# Patient Record
Sex: MALE | Race: WHITE | NOT HISPANIC OR LATINO | Employment: OTHER | URBAN - METROPOLITAN AREA
[De-identification: names, ages, dates, MRNs, and addresses within clinical notes are randomized per-mention and may not be internally consistent; named-entity substitution may affect disease eponyms.]

---

## 2017-02-17 ENCOUNTER — HOSPITAL ENCOUNTER (EMERGENCY)
Facility: HOSPITAL | Age: 47
Discharge: HOME | End: 2017-02-17
Attending: EMERGENCY MEDICINE | Admitting: EMERGENCY MEDICINE
Payer: COMMERCIAL

## 2017-02-17 VITALS
TEMPERATURE: 97.8 F | RESPIRATION RATE: 17 BRPM | HEART RATE: 95 BPM | OXYGEN SATURATION: 97 % | SYSTOLIC BLOOD PRESSURE: 177 MMHG | BODY MASS INDEX: 38.57 KG/M2 | HEIGHT: 66 IN | WEIGHT: 240 LBS | DIASTOLIC BLOOD PRESSURE: 81 MMHG

## 2017-02-17 DIAGNOSIS — S39.012A LOW BACK STRAIN, INITIAL ENCOUNTER: Primary | ICD-10-CM

## 2017-02-17 PROCEDURE — 99283 EMERGENCY DEPT VISIT LOW MDM: CPT

## 2017-02-17 PROCEDURE — 96372 THER/PROPH/DIAG INJ SC/IM: CPT

## 2017-02-17 RX ORDER — CYCLOBENZAPRINE HCL 10 MG
10 TABLET ORAL 3 TIMES DAILY PRN
Qty: 30 TABLET | Refills: 0 | Status: SHIPPED | OUTPATIENT
Start: 2017-02-17 | End: 2017-06-07

## 2017-02-17 RX ORDER — LIDOCAINE 50 MG/G
1 PATCH TOPICAL ONCE
Status: DISCONTINUED | OUTPATIENT
Start: 2017-02-17 | End: 2017-02-17 | Stop reason: HOSPADM

## 2017-02-17 RX ORDER — NAPROXEN 500 MG/1
500 TABLET ORAL 2 TIMES DAILY WITH MEALS
Qty: 10 TABLET | Refills: 0 | Status: SHIPPED | OUTPATIENT
Start: 2017-02-17 | End: 2017-11-18

## 2017-02-17 RX ORDER — DIAZEPAM 5 MG/ML
5 INJECTION, SOLUTION INTRAMUSCULAR; INTRAVENOUS ONCE
Status: COMPLETED | OUTPATIENT
Start: 2017-02-17 | End: 2017-02-17

## 2017-02-17 RX ORDER — HYDROCODONE BITARTRATE AND ACETAMINOPHEN 5; 325 MG/1; MG/1
1 TABLET ORAL ONCE
Status: COMPLETED | OUTPATIENT
Start: 2017-02-17 | End: 2017-02-17

## 2017-02-17 RX ORDER — KETOROLAC TROMETHAMINE 30 MG/ML
30 INJECTION, SOLUTION INTRAMUSCULAR; INTRAVENOUS ONCE
Status: COMPLETED | OUTPATIENT
Start: 2017-02-17 | End: 2017-02-17

## 2017-02-17 RX ADMIN — KETOROLAC TROMETHAMINE 30 MG: 30 INJECTION, SOLUTION INTRAMUSCULAR at 14:32

## 2017-02-17 RX ADMIN — HYDROCODONE BITARTRATE AND ACETAMINOPHEN 1 TABLET: 5; 325 TABLET ORAL at 14:32

## 2017-02-17 RX ADMIN — DIAZEPAM 5 MG: 5 INJECTION, SOLUTION INTRAMUSCULAR; INTRAVENOUS at 14:32

## 2017-02-17 RX ADMIN — LIDOCAINE 1 PATCH: 50 PATCH CUTANEOUS at 14:32

## 2017-06-07 ENCOUNTER — HOSPITAL ENCOUNTER (EMERGENCY)
Facility: HOSPITAL | Age: 47
Discharge: HOME | End: 2017-06-07
Attending: EMERGENCY MEDICINE
Payer: COMMERCIAL

## 2017-06-07 ENCOUNTER — APPOINTMENT (EMERGENCY)
Dept: RADIOLOGY | Facility: HOSPITAL | Age: 47
End: 2017-06-07
Payer: COMMERCIAL

## 2017-06-07 VITALS
OXYGEN SATURATION: 98 % | RESPIRATION RATE: 18 BRPM | HEIGHT: 66 IN | HEART RATE: 95 BPM | WEIGHT: 230 LBS | BODY MASS INDEX: 36.96 KG/M2 | SYSTOLIC BLOOD PRESSURE: 130 MMHG | TEMPERATURE: 98.1 F | DIASTOLIC BLOOD PRESSURE: 93 MMHG

## 2017-06-07 DIAGNOSIS — S89.91XA RIGHT KNEE INJURY, INITIAL ENCOUNTER: Primary | ICD-10-CM

## 2017-06-07 PROCEDURE — 73564 X-RAY EXAM KNEE 4 OR MORE: CPT

## 2017-06-07 PROCEDURE — 99283 EMERGENCY DEPT VISIT LOW MDM: CPT

## 2017-06-07 RX ORDER — HYDROCODONE BITARTRATE AND ACETAMINOPHEN 5; 325 MG/1; MG/1
1 TABLET ORAL ONCE
Status: COMPLETED | OUTPATIENT
Start: 2017-06-07 | End: 2017-06-07

## 2017-06-07 RX ORDER — HYDROCODONE BITARTRATE AND ACETAMINOPHEN 5; 325 MG/1; MG/1
1 TABLET ORAL EVERY 6 HOURS PRN
Qty: 15 TABLET | Refills: 0 | Status: SHIPPED | OUTPATIENT
Start: 2017-06-07 | End: 2017-11-18

## 2017-06-07 RX ADMIN — HYDROCODONE BITARTRATE AND ACETAMINOPHEN 1 TABLET: 5; 325 TABLET ORAL at 20:47

## 2017-09-28 ENCOUNTER — HOSPITAL ENCOUNTER (EMERGENCY)
Facility: HOSPITAL | Age: 47
Discharge: HOME | End: 2017-09-28
Attending: EMERGENCY MEDICINE | Admitting: EMERGENCY MEDICINE
Payer: COMMERCIAL

## 2017-09-28 ENCOUNTER — APPOINTMENT (EMERGENCY)
Dept: RADIOLOGY | Facility: HOSPITAL | Age: 47
End: 2017-09-28
Payer: COMMERCIAL

## 2017-09-28 VITALS
HEART RATE: 71 BPM | RESPIRATION RATE: 20 BRPM | DIASTOLIC BLOOD PRESSURE: 65 MMHG | OXYGEN SATURATION: 94 % | SYSTOLIC BLOOD PRESSURE: 134 MMHG | BODY MASS INDEX: 37.12 KG/M2 | TEMPERATURE: 97.8 F | WEIGHT: 230 LBS

## 2017-09-28 DIAGNOSIS — M10.9 ACUTE GOUT OF LEFT FOOT: Primary | ICD-10-CM

## 2017-09-28 LAB
ALBUMIN SERPL BCP-MCNC: 3.7 G/DL (ref 3.5–5)
ALP SERPL-CCNC: 75 U/L (ref 46–116)
ALT SERPL W P-5'-P-CCNC: 17 U/L (ref 12–78)
ANION GAP SERPL CALCULATED.3IONS-SCNC: 6 MMOL/L (ref 4–13)
AST SERPL W P-5'-P-CCNC: 10 U/L (ref 5–45)
BASOPHILS # BLD AUTO: 0.06 THOUSANDS/ΜL (ref 0–0.1)
BASOPHILS NFR BLD AUTO: 1 % (ref 0–1)
BILIRUB SERPL-MCNC: 0.3 MG/DL (ref 0.2–1)
BUN SERPL-MCNC: 20 MG/DL (ref 5–25)
CALCIUM SERPL-MCNC: 9.2 MG/DL (ref 8.3–10.1)
CHLORIDE SERPL-SCNC: 99 MMOL/L (ref 100–108)
CO2 SERPL-SCNC: 32 MMOL/L (ref 21–32)
CREAT SERPL-MCNC: 1.14 MG/DL (ref 0.6–1.3)
EOSINOPHIL # BLD AUTO: 0.22 THOUSAND/ΜL (ref 0–0.61)
EOSINOPHIL NFR BLD AUTO: 2 % (ref 0–6)
ERYTHROCYTE [DISTWIDTH] IN BLOOD BY AUTOMATED COUNT: 12.3 % (ref 11.6–15.1)
GFR SERPL CREATININE-BSD FRML MDRD: 77 ML/MIN/1.73SQ M
GLUCOSE SERPL-MCNC: 129 MG/DL (ref 65–140)
HCT VFR BLD AUTO: 49.5 % (ref 36.5–49.3)
HGB BLD-MCNC: 16.9 G/DL (ref 12–17)
LYMPHOCYTES # BLD AUTO: 2.28 THOUSANDS/ΜL (ref 0.6–4.47)
LYMPHOCYTES NFR BLD AUTO: 19 % (ref 14–44)
MCH RBC QN AUTO: 32.5 PG (ref 26.8–34.3)
MCHC RBC AUTO-ENTMCNC: 34.1 G/DL (ref 31.4–37.4)
MCV RBC AUTO: 95 FL (ref 82–98)
MONOCYTES # BLD AUTO: 0.89 THOUSAND/ΜL (ref 0.17–1.22)
MONOCYTES NFR BLD AUTO: 7 % (ref 4–12)
NEUTROPHILS # BLD AUTO: 8.49 THOUSANDS/ΜL (ref 1.85–7.62)
NEUTS SEG NFR BLD AUTO: 71 % (ref 43–75)
NRBC BLD AUTO-RTO: 0 /100 WBCS
PLATELET # BLD AUTO: 275 THOUSANDS/UL (ref 149–390)
PMV BLD AUTO: 10.6 FL (ref 8.9–12.7)
POTASSIUM SERPL-SCNC: 4.8 MMOL/L (ref 3.5–5.3)
PROT SERPL-MCNC: 7.3 G/DL (ref 6.4–8.2)
RBC # BLD AUTO: 5.2 MILLION/UL (ref 3.88–5.62)
SODIUM SERPL-SCNC: 137 MMOL/L (ref 136–145)
URATE SERPL-MCNC: 7.2 MG/DL (ref 4.2–8)
WBC # BLD AUTO: 11.98 THOUSAND/UL (ref 4.31–10.16)

## 2017-09-28 PROCEDURE — 85025 COMPLETE CBC W/AUTO DIFF WBC: CPT | Performed by: EMERGENCY MEDICINE

## 2017-09-28 PROCEDURE — 96372 THER/PROPH/DIAG INJ SC/IM: CPT

## 2017-09-28 PROCEDURE — 36415 COLL VENOUS BLD VENIPUNCTURE: CPT

## 2017-09-28 PROCEDURE — 84550 ASSAY OF BLOOD/URIC ACID: CPT | Performed by: EMERGENCY MEDICINE

## 2017-09-28 PROCEDURE — 99283 EMERGENCY DEPT VISIT LOW MDM: CPT

## 2017-09-28 PROCEDURE — 73630 X-RAY EXAM OF FOOT: CPT

## 2017-09-28 PROCEDURE — 80053 COMPREHEN METABOLIC PANEL: CPT | Performed by: EMERGENCY MEDICINE

## 2017-09-28 RX ORDER — OXYCODONE HYDROCHLORIDE AND ACETAMINOPHEN 5; 325 MG/1; MG/1
2 TABLET ORAL ONCE
Status: COMPLETED | OUTPATIENT
Start: 2017-09-28 | End: 2017-09-28

## 2017-09-28 RX ORDER — NAPROXEN 500 MG/1
500 TABLET ORAL 2 TIMES DAILY WITH MEALS
Qty: 20 TABLET | Refills: 0 | Status: SHIPPED | OUTPATIENT
Start: 2017-09-28 | End: 2017-11-18

## 2017-09-28 RX ORDER — KETOROLAC TROMETHAMINE 30 MG/ML
30 INJECTION, SOLUTION INTRAMUSCULAR; INTRAVENOUS ONCE
Status: COMPLETED | OUTPATIENT
Start: 2017-09-28 | End: 2017-09-28

## 2017-09-28 RX ADMIN — KETOROLAC TROMETHAMINE 30 MG: 30 INJECTION, SOLUTION INTRAMUSCULAR at 11:42

## 2017-09-28 RX ADMIN — OXYCODONE HYDROCHLORIDE AND ACETAMINOPHEN 2 TABLET: 5; 325 TABLET ORAL at 11:41

## 2017-09-28 NOTE — DISCHARGE INSTRUCTIONS
Gout   WHAT YOU NEED TO KNOW:   Gout is a form of arthritis that causes severe joint pain, redness, swelling, and stiffness  Acute gout pain starts suddenly, gets worse quickly, and stops on its own  Acute gout can become chronic and cause permanent damage to the joints  DISCHARGE INSTRUCTIONS:   Return to the emergency department if:   · You have severe pain in one or more of your joints that you cannot tolerate  · You have a fever or redness that spreads beyond the joint area  Contact your healthcare provider if:   · You have new symptoms, such as a rash, after you start gout treatment  · Your joint pain and swelling do not go away, even after treatment  · You are not urinating as much or as often as you usually do  · You have trouble taking your gout medicines  · You have questions or concerns about your condition or care  Medicines: You may need any of the following:  · Prescription pain medicine  may be given  Ask your healthcare provider how to take this medicine safely  Some prescription pain medicines contain acetaminophen  Do not take other medicines that contain acetaminophen without talking to your healthcare provider  Too much acetaminophen may cause liver damage  Prescription pain medicine may cause constipation  Ask your healthcare provider how to prevent or treat constipation  · NSAIDs , such as ibuprofen, help decrease swelling, pain, and fever  This medicine is available with or without a doctor's order  NSAIDs can cause stomach bleeding or kidney problems in certain people  If you take blood thinner medicine, always ask your healthcare provider if NSAIDs are safe for you  Always read the medicine label and follow directions  · Gout medicine  decreases joint pain and swelling  It may also be given to prevent new gout attacks  · Steroids  reduce inflammation and can help your joint stiffness and pain during gout attacks      · Uric acid medicine  may be given to reduce the amount of uric acid your body makes  Some medicines may help you pass more uric acid when you urinate  · Take your medicine as directed  Contact your healthcare provider if you think your medicine is not helping or if you have side effects  Tell him or her if you are allergic to any medicine  Keep a list of the medicines, vitamins, and herbs you take  Include the amounts, and when and why you take them  Bring the list or the pill bottles to follow-up visits  Carry your medicine list with you in case of an emergency  Follow up with your healthcare provider as directed:  Write down your questions so you remember to ask them during your visits  Manage gout:   · Rest your painful joint so it can heal   Your healthcare provider may recommend crutches or a walker if the affected joint is in a leg  · Apply ice to your joint  Ice decreases pain and swelling  Use an ice pack, or put crushed ice in a plastic bag  Cover the ice pack or bag with a towel before you apply it to your painful joint  Apply ice for 15 to 20 minutes every hour, or as directed  · Elevate your joint  Elevation helps reduce swelling and pain  Raise your joint above the level of your heart as often as you can  Prop your painful joint on pillows to keep it above your heart comfortably  · Go to physical therapy if directed  A physical therapist can teach you exercises to improve flexibility and range of motion  Prevent gout attacks:   · Do not eat high-purine foods  These foods include meats, seafood, asparagus, spinach, cauliflower, and some types of beans  Healthcare providers may tell you to eat more low-fat milk products, such as yogurt  Milk products may decrease your risk for gout attacks  Vitamin C and coffee may also help  Your healthcare provider or dietitian can help you create a meal plan  · Drink more liquids as directed  Liquids such as water help remove uric acid from your body   Ask how much liquid to drink each day and which liquids are best for you  · Manage your weight  Weight loss may decrease the amount of uric acid in your body  Exercise can help you lose weight  Talk to your healthcare provider about the best exercises for you  · Control your blood sugar level if you have diabetes  Keep your blood sugar level in a normal range  This can help prevent gout attacks  · Limit or do not drink alcohol as directed  Alcohol can trigger a gout attack  Alcohol also increases your risk for dehydration  Ask your healthcare provider if alcohol is safe for you  © 2017 [facility] Information is for End User's use only and may not be sold, redistributed or otherwise used for commercial purposes  All illustrations and images included in CareNotes® are the copyrighted property of [employer] D [employer]  or Andres Willis  The above information is an  only  It is not intended as medical advice for individual conditions or treatments  Talk to your doctor, nurse or pharmacist before following any medical regimen to see if it is safe and effective for you

## 2017-09-28 NOTE — ED PROVIDER NOTES
History  Chief Complaint   Patient presents with    Gout Pain     PT c/o "gout flare up" in left foot starting yesterday  No other pain any where else  Pt states he started prednisone yesterday  History provided by:  Patient   used: No    Toe Pain   Location:  Left great toe  Severity:  Moderate  Onset quality:  Sudden  Duration:  2 days  Timing:  Constant  Progression:  Worsening  Chronicity:  Recurrent  Context:  Hx of gout  Relieved by:  Nothing  Worsened by:  Palp  Ineffective treatments:  None tried  Associated symptoms: no abdominal pain, no chest pain, no congestion, no cough, no diarrhea, no fatigue, no fever, no headaches, no nausea, no rash, no shortness of breath, no sore throat and no vomiting        Prior to Admission Medications   Prescriptions Last Dose Informant Patient Reported? Taking? HYDROcodone-acetaminophen (NORCO) 5-325 mg per tablet   No No   Sig: Take 1 tablet by mouth every 6 (six) hours as needed for pain for up to 15 doses Max Daily Amount: 4 tablets   allopurinol (ZYLOPRIM) 300 mg tablet   Yes No   Sig: Take 300 mg by mouth daily   losartan (COZAAR) 100 MG tablet   Yes No   Sig: Take 100 mg by mouth daily   methocarbamol (ROBAXIN) 750 mg tablet   Yes No   Sig: Take 750 mg by mouth 4 (four) times a day   naproxen (NAPROSYN) 500 mg tablet   No No   Sig: Take 1 tablet by mouth 2 (two) times a day with meals for 5 days      Facility-Administered Medications: None       Past Medical History:   Diagnosis Date    Gout     Hypertension        Past Surgical History:   Procedure Laterality Date    APPENDECTOMY      BACK SURGERY      JOINT REPLACEMENT      REPLACEMENT TOTAL KNEE Right        History reviewed  No pertinent family history  I have reviewed and agree with the history as documented      Social History   Substance Use Topics    Smoking status: Current Every Day Smoker     Packs/day: 2 00     Types: Cigarettes    Smokeless tobacco: Never Used   Alyssa Jean Alcohol use 2 4 oz/week     4 Cans of beer per week        Review of Systems   Constitutional: Negative for activity change, appetite change, diaphoresis, fatigue and fever  HENT: Negative for congestion, sore throat and trouble swallowing  Eyes: Negative for pain and visual disturbance  Respiratory: Negative for cough, chest tightness and shortness of breath  Cardiovascular: Negative for chest pain  Gastrointestinal: Negative for abdominal pain, diarrhea, nausea and vomiting  Endocrine: Negative for polyphagia and polyuria  Genitourinary: Negative for dysuria, flank pain, frequency, hematuria and urgency  Musculoskeletal: Negative for back pain, gait problem, neck pain and neck stiffness  Skin: Negative for color change and rash  Allergic/Immunologic: Negative for immunocompromised state  Neurological: Negative for dizziness, speech difficulty, numbness and headaches  Hematological: Does not bruise/bleed easily  Psychiatric/Behavioral: Negative for confusion and decreased concentration  Physical Exam  ED Triage Vitals   Temperature Pulse Respirations Blood Pressure SpO2   09/28/17 1005 09/28/17 1005 09/28/17 1005 09/28/17 1005 09/28/17 1005   97 8 °F (36 6 °C) 80 20 148/91 96 %      Temp Source Heart Rate Source Patient Position - Orthostatic VS BP Location FiO2 (%)   09/28/17 1005 09/28/17 1005 09/28/17 1005 09/28/17 1213 --   Temporal Monitor Lying Right arm       Pain Score       09/28/17 1005       Worst Possible Pain           Physical Exam   Constitutional: He is oriented to person, place, and time  He appears well-developed and well-nourished  HENT:   Head: Normocephalic  Eyes: Conjunctivae and EOM are normal  Pupils are equal, round, and reactive to light  No scleral icterus  Neck: Normal range of motion  Neck supple  Cardiovascular: Normal rate and regular rhythm  Pulmonary/Chest: Effort normal and breath sounds normal  No respiratory distress     Abdominal: Soft  Bowel sounds are normal  He exhibits no distension  There is no tenderness  There is no rebound and no guarding  Musculoskeletal: Normal range of motion  He exhibits tenderness  He exhibits no deformity  Exquisite tenderness to palpation over the left 1st MTP joint  Otherwise unremarkable exam   Normal capillary refill  Palpable DP and PT pulses  Normal sensation  Lymphadenopathy:     He has no cervical adenopathy  Neurological: He is alert and oriented to person, place, and time  Coordination normal    Skin: Skin is warm and dry  He is not diaphoretic  Psychiatric: He has a normal mood and affect  Vitals reviewed        ED Medications  Medications   ketorolac (TORADOL) 30 mg/mL injection 30 mg (30 mg Intramuscular Given 9/28/17 1142)   oxyCODONE-acetaminophen (PERCOCET) 5-325 mg per tablet 2 tablet (2 tablets Oral Given 9/28/17 1141)       Diagnostic Studies  Labs Reviewed   CBC AND DIFFERENTIAL - Abnormal        Result Value Ref Range Status    WBC 11 98 (*) 4 31 - 10 16 Thousand/uL Final    Hematocrit 49 5 (*) 36 5 - 49 3 % Final    Neutrophils Absolute 8 49 (*) 1 85 - 7 62 Thousands/µL Final    RBC 5 20  3 88 - 5 62 Million/uL Final    Hemoglobin 16 9  12 0 - 17 0 g/dL Final    MCV 95  82 - 98 fL Final    MCH 32 5  26 8 - 34 3 pg Final    MCHC 34 1  31 4 - 37 4 g/dL Final    RDW 12 3  11 6 - 15 1 % Final    MPV 10 6  8 9 - 12 7 fL Final    Platelets 101  737 - 390 Thousands/uL Final    nRBC 0  /100 WBCs Final    Neutrophils Relative 71  43 - 75 % Final    Lymphocytes Relative 19  14 - 44 % Final    Monocytes Relative 7  4 - 12 % Final    Eosinophils Relative 2  0 - 6 % Final    Basophils Relative 1  0 - 1 % Final    Lymphocytes Absolute 2 28  0 60 - 4 47 Thousands/µL Final    Monocytes Absolute 0 89  0 17 - 1 22 Thousand/µL Final    Eosinophils Absolute 0 22  0 00 - 0 61 Thousand/µL Final    Basophils Absolute 0 06  0 00 - 0 10 Thousands/µL Final   COMPREHENSIVE METABOLIC PANEL - Abnormal Chloride 99 (*) 100 - 108 mmol/L Final    Sodium 137  136 - 145 mmol/L Final    Potassium 4 8  3 5 - 5 3 mmol/L Final    Comment: Slightly Hemolyzed; Results May be Affected    CO2 32  21 - 32 mmol/L Final    Anion Gap 6  4 - 13 mmol/L Final    BUN 20  5 - 25 mg/dL Final    Creatinine 1 14  0 60 - 1 30 mg/dL Final    Comment: Standardized to IDMS reference method    Glucose 129  65 - 140 mg/dL Final    Comment:   If the patient is fasting, the ADA then defines impaired fasting glucose as > 100 mg/dL and diabetes as > or equal to 123 mg/dL  Specimen collection should occur prior to Sulfasalazine administration due to the potential for falsely depressed results  Specimen collection should occur prior to Sulfapyridine administration due to the potential for falsely elevated results  Calcium 9 2  8 3 - 10 1 mg/dL Final    AST 10  5 - 45 U/L Final    Comment: Slightly Hemolyzed; Results May be Affected  Specimen collection should occur prior to Sulfasalazine administration due to the potential for falsely depressed results  ALT 17  12 - 78 U/L Final    Comment:   Specimen collection should occur prior to Sulfasalazine administration due to the potential for falsely depressed results  Alkaline Phosphatase 75  46 - 116 U/L Final    Total Protein 7 3  6 4 - 8 2 g/dL Final    Albumin 3 7  3 5 - 5 0 g/dL Final    Total Bilirubin 0 30  0 20 - 1 00 mg/dL Final    eGFR 77  ml/min/1 73sq m Final    Narrative:     National Kidney Disease Education Program recommendations are as follows:  GFR calculation is accurate only with a steady state creatinine  Chronic Kidney disease less than 60 ml/min/1 73 sq  meters  Kidney failure less than 15 ml/min/1 73 sq  meters  URIC ACID - Normal    Uric Acid 7 2  4 2 - 8 0 mg/dL Final    Comment: Specimen collection should occur prior to Metamizole administration due to the potential for falsely depressed results         XR foot 3+ views LEFT    (Results Pending) Procedures  Procedures      Phone Contacts  ED Phone Contact    ED Course  ED Course                                MDM  Number of Diagnoses or Management Options  Acute gout of left foot: new and requires workup  Diagnosis management comments: 32-year-old male with history of gout presents complaining of left-sided foot pain that started yesterday  The patient initially had some pain over the arch of his foot but then the pain seemed to migrate to the base of his left great toe  Pain is now severe and much worse with any pressure  No fevers or recent illnesses  No trauma  Denies other complaints  Patient has a history of gout and states that his current symptoms seem similar to prior episodes  Patient started a course of prednisone yesterday  Presents to the ED for worsening symptoms  X-ray was negative for fracture dislocation  Patient was treated symptomatically for presumed gout flare  He will be discharged on NSAIDs  Advised that he may continue to take prednisone and follow up promptly with his primary care physician  Discussed return precautions  Amount and/or Complexity of Data Reviewed  Tests in the radiology section of CPT®: reviewed and ordered  Review and summarize past medical records: yes      CritCare Time    Disposition  Final diagnoses:   Acute gout of left foot     ED Disposition     ED Disposition Condition Comment    Discharge  Guido Patel discharge to home/self care  Condition at discharge: Good        Follow-up Information     Follow up With Specialties Details Why Contact Info    Charity Carvajal MD   Please follow-up with your primary care physician in 2 to 3 days to ensure resolution of your symptoms  If you have new or worsening symptoms please call your doctor or return to the emergency department   [address] [place] [address]          Discharge Medication List as of 9/28/2017 12:13 PM      CONTINUE these medications which have CHANGED    Details   naproxen (NAPROSYN) 500 mg tablet Take 1 tablet by mouth 2 (two) times a day with meals for 10 days, Starting Thu 9/28/2017, Until Sun 10/8/2017, Print         CONTINUE these medications which have NOT CHANGED    Details   allopurinol (ZYLOPRIM) 300 mg tablet Take 300 mg by mouth daily, Until Discontinued, Historical Med      HYDROcodone-acetaminophen (NORCO) 5-325 mg per tablet Take 1 tablet by mouth every 6 (six) hours as needed for pain for up to 15 doses Max Daily Amount: 4 tablets, Starting 6/7/2017, Until Discontinued, Print      losartan (COZAAR) 100 MG tablet Take 100 mg by mouth daily, Until Discontinued, Historical Med      methocarbamol (ROBAXIN) 750 mg tablet Take 750 mg by mouth 4 (four) times a day, Until Discontinued, Historical Med           No discharge procedures on file      ED Provider  Electronically Signed by       Joan Underwood MD  09/28/17 8649

## 2017-11-18 ENCOUNTER — HOSPITAL ENCOUNTER (EMERGENCY)
Facility: HOSPITAL | Age: 47
Discharge: HOME | End: 2017-11-18
Attending: EMERGENCY MEDICINE | Admitting: EMERGENCY MEDICINE
Payer: COMMERCIAL

## 2017-11-18 ENCOUNTER — APPOINTMENT (EMERGENCY)
Dept: CT IMAGING | Facility: HOSPITAL | Age: 47
End: 2017-11-18
Payer: COMMERCIAL

## 2017-11-18 ENCOUNTER — APPOINTMENT (EMERGENCY)
Dept: RADIOLOGY | Facility: HOSPITAL | Age: 47
End: 2017-11-18
Payer: COMMERCIAL

## 2017-11-18 VITALS
BODY MASS INDEX: 36.96 KG/M2 | WEIGHT: 230 LBS | RESPIRATION RATE: 20 BRPM | SYSTOLIC BLOOD PRESSURE: 150 MMHG | DIASTOLIC BLOOD PRESSURE: 92 MMHG | TEMPERATURE: 97.6 F | OXYGEN SATURATION: 99 % | HEART RATE: 80 BPM | HEIGHT: 66 IN

## 2017-11-18 DIAGNOSIS — J18.9: Primary | ICD-10-CM

## 2017-11-18 LAB
ALBUMIN SERPL BCP-MCNC: 3.7 G/DL (ref 3.5–5)
ALP SERPL-CCNC: 64 U/L (ref 46–116)
ALT SERPL W P-5'-P-CCNC: 42 U/L (ref 12–78)
ANION GAP SERPL CALCULATED.3IONS-SCNC: 10 MMOL/L (ref 4–13)
AST SERPL W P-5'-P-CCNC: 26 U/L (ref 5–45)
BASOPHILS # BLD AUTO: 0.06 THOUSANDS/ΜL (ref 0–0.1)
BASOPHILS NFR BLD AUTO: 0 % (ref 0–1)
BILIRUB SERPL-MCNC: 0.3 MG/DL (ref 0.2–1)
BILIRUB UR QL STRIP: NEGATIVE
BUN SERPL-MCNC: 11 MG/DL (ref 5–25)
CALCIUM SERPL-MCNC: 9 MG/DL (ref 8.3–10.1)
CHLORIDE SERPL-SCNC: 104 MMOL/L (ref 100–108)
CLARITY UR: CLEAR
CO2 SERPL-SCNC: 26 MMOL/L (ref 21–32)
COLOR UR: YELLOW
CREAT SERPL-MCNC: 0.98 MG/DL (ref 0.6–1.3)
EOSINOPHIL # BLD AUTO: 0.17 THOUSAND/ΜL (ref 0–0.61)
EOSINOPHIL NFR BLD AUTO: 1 % (ref 0–6)
ERYTHROCYTE [DISTWIDTH] IN BLOOD BY AUTOMATED COUNT: 13.3 % (ref 11.6–15.1)
GFR SERPL CREATININE-BSD FRML MDRD: 93 ML/MIN/1.73SQ M
GLUCOSE SERPL-MCNC: 105 MG/DL (ref 65–140)
GLUCOSE UR STRIP-MCNC: NEGATIVE MG/DL
HCT VFR BLD AUTO: 44.1 % (ref 36.5–49.3)
HGB BLD-MCNC: 15.1 G/DL (ref 12–17)
HGB UR QL STRIP.AUTO: NEGATIVE
KETONES UR STRIP-MCNC: ABNORMAL MG/DL
LEUKOCYTE ESTERASE UR QL STRIP: NEGATIVE
LYMPHOCYTES # BLD AUTO: 3.78 THOUSANDS/ΜL (ref 0.6–4.47)
LYMPHOCYTES NFR BLD AUTO: 26 % (ref 14–44)
MCH RBC QN AUTO: 31.9 PG (ref 26.8–34.3)
MCHC RBC AUTO-ENTMCNC: 34.2 G/DL (ref 31.4–37.4)
MCV RBC AUTO: 93 FL (ref 82–98)
MONOCYTES # BLD AUTO: 1.09 THOUSAND/ΜL (ref 0.17–1.22)
MONOCYTES NFR BLD AUTO: 8 % (ref 4–12)
NEUTROPHILS # BLD AUTO: 9.19 THOUSANDS/ΜL (ref 1.85–7.62)
NEUTS SEG NFR BLD AUTO: 64 % (ref 43–75)
NITRITE UR QL STRIP: NEGATIVE
NRBC BLD AUTO-RTO: 0 /100 WBCS
PH UR STRIP.AUTO: 5.5 [PH] (ref 4.5–8)
PLATELET # BLD AUTO: 346 THOUSANDS/UL (ref 149–390)
PMV BLD AUTO: 10.6 FL (ref 8.9–12.7)
POTASSIUM SERPL-SCNC: 3.9 MMOL/L (ref 3.5–5.3)
PROT SERPL-MCNC: 7.1 G/DL (ref 6.4–8.2)
PROT UR STRIP-MCNC: NEGATIVE MG/DL
RBC # BLD AUTO: 4.74 MILLION/UL (ref 3.88–5.62)
SODIUM SERPL-SCNC: 140 MMOL/L (ref 136–145)
SP GR UR STRIP.AUTO: 1.02 (ref 1–1.03)
UROBILINOGEN UR QL STRIP.AUTO: 0.2 E.U./DL
WBC # BLD AUTO: 14.36 THOUSAND/UL (ref 4.31–10.16)

## 2017-11-18 PROCEDURE — 80053 COMPREHEN METABOLIC PANEL: CPT | Performed by: EMERGENCY MEDICINE

## 2017-11-18 PROCEDURE — 99284 EMERGENCY DEPT VISIT MOD MDM: CPT

## 2017-11-18 PROCEDURE — 96361 HYDRATE IV INFUSION ADD-ON: CPT

## 2017-11-18 PROCEDURE — 96365 THER/PROPH/DIAG IV INF INIT: CPT

## 2017-11-18 PROCEDURE — 81003 URINALYSIS AUTO W/O SCOPE: CPT | Performed by: EMERGENCY MEDICINE

## 2017-11-18 PROCEDURE — 71020 HB CHEST X-RAY 2VW FRONTAL&LATL: CPT

## 2017-11-18 PROCEDURE — 96375 TX/PRO/DX INJ NEW DRUG ADDON: CPT

## 2017-11-18 PROCEDURE — 85025 COMPLETE CBC W/AUTO DIFF WBC: CPT | Performed by: EMERGENCY MEDICINE

## 2017-11-18 PROCEDURE — 36415 COLL VENOUS BLD VENIPUNCTURE: CPT | Performed by: EMERGENCY MEDICINE

## 2017-11-18 PROCEDURE — 74176 CT ABD & PELVIS W/O CONTRAST: CPT

## 2017-11-18 RX ORDER — AZITHROMYCIN 250 MG/1
500 TABLET, FILM COATED ORAL ONCE
Status: COMPLETED | OUTPATIENT
Start: 2017-11-18 | End: 2017-11-18

## 2017-11-18 RX ORDER — KETOROLAC TROMETHAMINE 30 MG/ML
15 INJECTION, SOLUTION INTRAMUSCULAR; INTRAVENOUS ONCE
Status: COMPLETED | OUTPATIENT
Start: 2017-11-18 | End: 2017-11-18

## 2017-11-18 RX ORDER — ONDANSETRON 2 MG/ML
4 INJECTION INTRAMUSCULAR; INTRAVENOUS ONCE
Status: COMPLETED | OUTPATIENT
Start: 2017-11-18 | End: 2017-11-18

## 2017-11-18 RX ORDER — AZITHROMYCIN 250 MG/1
250 TABLET, FILM COATED ORAL DAILY
Qty: 4 TABLET | Refills: 0 | Status: SHIPPED | OUTPATIENT
Start: 2017-11-18 | End: 2017-11-22

## 2017-11-18 RX ORDER — IBUPROFEN 400 MG/1
800 TABLET ORAL ONCE
Status: COMPLETED | OUTPATIENT
Start: 2017-11-18 | End: 2017-11-18

## 2017-11-18 RX ORDER — IBUPROFEN 800 MG/1
800 TABLET ORAL 2 TIMES DAILY
Qty: 20 TABLET | Refills: 0 | Status: SHIPPED | OUTPATIENT
Start: 2017-11-18 | End: 2018-07-16 | Stop reason: ALTCHOICE

## 2017-11-18 RX ADMIN — CEFTRIAXONE 2000 MG: 2 INJECTION, SOLUTION INTRAVENOUS at 22:45

## 2017-11-18 RX ADMIN — ONDANSETRON 4 MG: 2 INJECTION INTRAMUSCULAR; INTRAVENOUS at 20:32

## 2017-11-18 RX ADMIN — IBUPROFEN 800 MG: 400 TABLET, FILM COATED ORAL at 22:45

## 2017-11-18 RX ADMIN — KETOROLAC TROMETHAMINE 15 MG: 30 INJECTION, SOLUTION INTRAMUSCULAR at 20:33

## 2017-11-18 RX ADMIN — AZITHROMYCIN 500 MG: 250 TABLET, FILM COATED ORAL at 22:45

## 2017-11-18 RX ADMIN — SODIUM CHLORIDE 1000 ML: 0.9 INJECTION, SOLUTION INTRAVENOUS at 20:27

## 2017-11-19 NOTE — DISCHARGE INSTRUCTIONS
Pneumonitis   WHAT YOU NEED TO KNOW:   Pneumonitis is inflammation of your lungs  The inflammation can make it hard to breathe and prevent you from getting enough oxygen  Anything that irritates your lung tissues can lead to pneumonitis  The longer you are exposed, the more damage your lungs will develop  Pneumonitis can last a short time or become chronic  DISCHARGE INSTRUCTIONS:   Return to the emergency department if:   · You have trouble breathing  · You faint or cannot think clearly  · You cough up blood  · Your lips or fingernails turn blue or gray  · Your lips, tongue, or throat swell and you have trouble breathing or swallowing  Contact your healthcare provider if:   · You have a fever that lasts more than 3 days, even with treatment  · Your chest pain or breathing problems do not go away or get worse  · Your cough does not get better with treatment  · You vomit or have diarrhea  · You have questions or concerns about your condition or care  Medicines:   · Medicines  may help decrease coughing and inflammation, open airways, and make it easier for you to breathe  You may also need medicine to treat a bacterial infection  · Take your medicine as directed  Contact your healthcare provider if you think your medicine is not helping or if you have side effects  Tell him or her if you are allergic to any medicine  Keep a list of the medicines, vitamins, and herbs you take  Include the amounts, and when and why you take them  Bring the list or the pill bottles to follow-up visits  Carry your medicine list with you in case of an emergency  Manage your symptoms:   · Rest as directed  Keep the head of your bed raised to help you breathe easier  You can also raise your head and shoulders up on pillows or rest in a reclining chair  · Do deep breathing and coughing  Deep breaths help open your airway and clear mucus or congestion   Take a deep breath and hold it for as long as you can  Let the air out and then cough strongly  You may be given an incentive spirometer to help you take deep breaths  Put the plastic piece in your mouth  Take a slow, deep breath  Then let the air out and cough  Repeat these steps as directed by your healthcare provider  · Do not smoke  Avoid secondhand smoke  Nicotine and other chemicals in cigarettes and cigars can make it harder for your lung inflammation to get better  Ask your healthcare provider for information if you currently smoke and need help to quit  E-cigarettes or smokeless tobacco still contain nicotine  Talk to your healthcare provider before you use these products  · Do not drink alcohol when you are sick  Alcohol dulls your urge to cough and sneeze  Alcohol also causes your body to lose fluid  This can make the mucus in your lungs thicker and harder to cough up  · Drink more liquids  Liquids help keep your air passages moist and better able to get rid of germs and other irritants  Ask your healthcare provider how much liquid to drink each day and which liquids are best for you  · Use a cool mist humidifier  A humidifier will help increase air moisture in your home  This may make it easier for you to breathe and help decrease your cough  · Go to pulmonary rehabilitation (rehab) as directed  Your healthcare provider may recommend rehab if you develop chronic pneumonitis  A rehab therapist can teach you breathing exercises to help keep your airway open  Prevent pneumonitis:   · Avoid anything that irritates your lungs  Examples include smoke, dust, and fumes  You may need to wear a mask if you work with something that irritates your lungs  · Be careful with household   Do not combine products that contain bleach and chlorine  Do not use these chemicals in closed spaces  Open a window or door to keep the air flowing  Follow up with your healthcare provider or pulmonologist as directed:   You may need to return for more tests  Write down your questions so you remember to ask them during your visits  © 2017 [address] Information is for End User's use only and may not be sold, redistributed or otherwise used for commercial purposes  All illustrations and images included in CareNotes® are the copyrighted property of [employer]  or Andres Willis  The above information is an  only  It is not intended as medical advice for individual conditions or treatments  Talk to your doctor, nurse or pharmacist before following any medical regimen to see if it is safe and effective for you  Cigarette Smoking and Your Health   AMBULATORY CARE:   Risks to your health if you smoke:  Nicotine and other chemicals found in tobacco damage every cell in your body  Even if you are a light smoker, you have an increased risk for cancer, heart disease, and lung disease  If you are pregnant or have diabetes, smoking increases your risk for complications  Benefits to your health if you stop smoking:   · You decrease respiratory symptoms such as coughing, wheezing, and shortness of breath  · You reduce your risk for cancers of the lung, mouth, throat, kidney, bladder, pancreas, stomach, and cervix  If you already have cancer, you increase the benefits of chemotherapy  You also reduce your risk for cancer returning or a second cancer from developing  · You reduce your risk for heart disease, blood clots, heart attack, and stroke  · You reduce your risk for lung infections, and diseases such as pneumonia, asthma, chronic bronchitis, and emphysema  · Your circulation improves  More oxygen can be delivered to your body  If you have diabetes, you lower your risk for complications, such as kidney, artery, and eye diseases  You also lower your risk for nerve damage  Nerve damage can lead to amputations, poor vision, and blindness      · You improve your body's ability to heal and to fight infections  Benefits to the health of others if you stop smoking:  Tobacco is harmful to nonsmokers who breathe in your secondhand smoke  The following are ways the health of others around you may improve when you stop smoking:  · You lower the risks for lung cancer and heart disease in nonsmoking adults  · If you are pregnant, you lower the risk for miscarriage, early delivery, low birth weight, and stillbirth  You also lower your baby's risk for SIDS, obesity, developmental delay, and neurobehavioral problems, such as ADHD  · If you have children, you lower their risk for ear infections, colds, pneumonia, bronchitis, and asthma  For more information and support to stop smoking:   · [employer]  gov  Phone: 2- 293 - 161-4206  Web Address: www [employer]  Follow up with your healthcare provider as directed:  Write down your questions so you remember to ask them during your visits  © 2017 [address] Information is for End User's use only and may not be sold, redistributed or otherwise used for commercial purposes  All illustrations and images included in CareNotes® are the copyrighted property of [employer]  or [address]  The above information is an  only  It is not intended as medical advice for individual conditions or treatments  Talk to your doctor, nurse or pharmacist before following any medical regimen to see if it is safe and effective for you  How to Stop Smoking   WHAT YOU NEED TO KNOW:   You will improve your health and the health of others around you if you stop smoking  Your risk for heart and lung disease, cancer, stroke, heart attack, and vision problems will also decrease  You can benefit from quitting no matter how long you have smoked  DISCHARGE INSTRUCTIONS:   Prepare to stop smoking:  Nicotine is a highly addictive drug found in cigarettes  Withdrawal symptoms can happen when you stop smoking and make it hard to quit  These include anxiety, depression, irritability, trouble sleeping, and increased appetite  You increase your chances of success if you prepare to quit  · Set a quit date  Deryl Sin a date that is within the next 2 weeks  Do not pick a day that you think may be stressful or busy  Write down the day or [place] it on your calender  · Tell friends and family that you plan to quit  Explain that you may have withdrawal symptoms when you try to quit  Ask them to support you  They may be able to encourage you and help reduce your stress to make it easier for you to quit  · Make a list of your reasons for quitting  Put the list somewhere you will see it every day, such as your refrigerator  You can look at the list when you have a craving  · Remove all tobacco and nicotine products from your home, car, and workplace  Also, remove anything else that will tempt you to smoke, such as lighters, matches, or ashtrays  Clean your car, home, and places at work that smell like smoke  The smell of smoke can trigger a craving  · Identify triggers that make you want to smoke  This may include activities, feelings, or people  Also write down 1 way you can deal with each of your triggers  For example, if you want to smoke as soon as you wake up, plan another activity during this time, such as exercise  · Make a plan for how you will quit  Learn about the tools that can help you quit, such as medicine, counseling, or nicotine replacement therapy  Choose at least 2 options to help you quit  Tools to help you stop smoking:   · Counseling  from a trained healthcare provider can provide you with support and skills to quit smoking  The provider will also teach you to manage your withdrawal symptoms and cravings  You may receive counseling from one counselor, in group therapy, or through phone therapy called a quit line       · Nicotine replacement therapy (NRT)  such as nicotine patches, gum, or lozenges may help reduce your nicotine cravings  You may get these without a doctor's order  Do not use e-cigarettes or smokeless tobacco in place of cigarettes or to help you quit  They still contain nicotine  · Prescription medicines  such as nasal sprays or nicotine inhalers may help reduce your withdrawal symptoms  Other medicines may also be used to reduce your urge to smoke  Ask your healthcare provider about these medicines  You may need to start certain medicines 2 weeks before your quit date for them to work well  · Hypnosis  is a practice that helps guide you through thoughts and feelings  Hypnosis may help decrease your cravings and make you more willing to quit  · Acupuncture therapy  uses very thin needles to balance energy channels in the body  This is thought to help decrease cravings and symptoms of nicotine withdrawal      · Support groups  let you talk to others who are trying to quit or have already quit  It may be helpful to speak with others about how they quit  Manage your cravings:   · Avoid situations, people, and places that tempt you to smoke  Go to nonsmoking places, such as libraries or restaurants  Understand what tempts you and try to avoid these things  · Keep your hands busy  Hold things such as a stress ball or pen  · Put candy or toothpicks in your mouth  Keep lollipops, sugarless gum, or toothpicks with you at all times  · Do not have alcohol or caffeine  These drinks may tempt you to smoke  Drink healthy liquids such as water or juice instead  · Reward yourself when you resist your cravings  Rewards will motivate you and help you stay positive  · Do an activity that distracts you from your craving  Examples include going for a walk, exercising, or cleaning  Prevent weight gain after you quit:  You may gain a few pounds after you quit smoking  It is healthier for you to gain a few pounds than to continue to smoke   The following can help you prevent weight gain:  · Eat healthy foods  These include fruits, vegetables, whole-grain breads, low-fat dairy products, beans, lean meats, and fish  Eat healthy snacks, such as low-fat yogurt, if you get hungry between meals  · Drink water before, during, and between meals  This will make your stomach feel full and help prevent you from overeating  Ask your healthcare provider how much liquid to drink each day and which liquids are best for you  · Exercise  Take a walk or do some kind of exercise every day  Ask your healthcare provider what exercise is right for you  This may help reduce your cravings and reduce stress  For support and more information:   · [employer]  gov  Phone: 9- 926 - 406-6504  Web Address: www "[employer]  © 2017 [address] Alan Information is for End User's use only and may not be sold, redistributed or otherwise used for commercial purposes  All illustrations and images included in CareNotes® are the copyrighted property of [employer] Inc  or Andres Willis  The above information is an  only  It is not intended as medical advice for individual conditions or treatments  Talk to your doctor, nurse or pharmacist before following any medical regimen to see if it is safe and effective for you

## 2017-11-19 NOTE — ED PROVIDER NOTES
History  Chief Complaint   Patient presents with    Back Pain     Pt reports right sided lower back pain that started this morning, has previous hx of back surgery; pt describes pain as someone "pounding with a sledgehammer", some burning with urination     HPI  82-year-old white male with a chief complaint of right-sided back pain that started this morning  Patient states he has prior history of back surgery however this pain is much different  Patient states that he feels like a sledgehammer is hitting him constantly in his right flank region  Patient also complains of some burning on urination x2 today  Patient has no prior history renal calculi stay  Patient states that his mother does have a history of having a kidney stone  Patient denies any fever, chills, nausea, or vomiting  Prior to Admission Medications   Prescriptions Last Dose Informant Patient Reported? Taking?   allopurinol (ZYLOPRIM) 300 mg tablet   Yes Yes   Sig: Take 300 mg by mouth daily   losartan (COZAAR) 100 MG tablet   Yes Yes   Sig: Take 100 mg by mouth daily   methocarbamol (ROBAXIN) 750 mg tablet   Yes Yes   Sig: Take 750 mg by mouth 4 (four) times a day      Facility-Administered Medications: None       Past Medical History:   Diagnosis Date    Gout     Hypertension        Past Surgical History:   Procedure Laterality Date    APPENDECTOMY      BACK SURGERY      JOINT REPLACEMENT      REPLACEMENT TOTAL KNEE Right        History reviewed  No pertinent family history  I have reviewed and agree with the history as documented  Social History   Substance Use Topics    Smoking status: Current Every Day Smoker     Packs/day: 2 00     Types: Cigarettes    Smokeless tobacco: Never Used    Alcohol use Yes      Comment: occasional        Review of Systems   Constitutional: Negative for chills and fever  HENT: Negative for congestion and rhinorrhea  Eyes: Negative for discharge and visual disturbance     Respiratory: Negative for shortness of breath and wheezing  Cardiovascular: Negative for chest pain and palpitations  Gastrointestinal: Negative for abdominal pain and vomiting  Endocrine: Negative for polydipsia and polyuria  Genitourinary: Positive for dysuria  Negative for hematuria  Musculoskeletal: Positive for back pain  Negative for arthralgias, gait problem and neck stiffness  Skin: Negative for rash and wound  Neurological: Negative for dizziness and headaches  Psychiatric/Behavioral: Negative for confusion and suicidal ideas  Physical Exam  ED Triage Vitals   Temperature Pulse Respirations Blood Pressure SpO2   11/18/17 1944 11/18/17 1944 11/18/17 1944 11/18/17 1944 11/18/17 1944   97 6 °F (36 4 °C) 84 22 (!) 175/90 100 %      Temp Source Heart Rate Source Patient Position - Orthostatic VS BP Location FiO2 (%)   11/18/17 1944 11/18/17 2127 11/18/17 1944 11/18/17 1944 --   Oral Monitor Sitting Right arm       Pain Score       11/18/17 1944       Worst Possible Pain           Orthostatic Vital Signs  Vitals:    11/18/17 1944 11/18/17 2127 11/18/17 2245   BP: (!) 175/90 149/76 150/92   Pulse: 84 72 80   Patient Position - Orthostatic VS: Sitting Sitting Sitting       Physical Exam   Constitutional: He is oriented to person, place, and time  He appears well-developed and well-nourished  79-year-old white male rocking on the edge of the stretcher complaining of right-sided back pain  HENT:   Head: Normocephalic and atraumatic  Mouth/Throat: Oropharynx is clear and moist    Eyes: EOM are normal  Pupils are equal, round, and reactive to light  Neck: Normal range of motion  Neck supple  Cardiovascular: Normal rate, regular rhythm and normal heart sounds  Pulmonary/Chest: Effort normal    Coarse breath sounds bilaterally  There is strong nicotine smell coming from patient  Abdominal: Soft  Bowel sounds are normal  He exhibits no mass  There is tenderness   There is no rebound and no guarding  A hernia is present  There is a central ventral hernia which patient states he has had an a large eschar to the right lower quadrant approximately 6 inches horizontally  Musculoskeletal: Normal range of motion  He exhibits no edema, tenderness or deformity  Back:  There is positive CVA tenderness on the right however patient also jumps when I tapped him on the left there is about a 2 inch eschar at the L3-L5 region of his low back  Patient states this pain is different than his chronic back pain  Neurological: He is alert and oriented to person, place, and time  No cranial nerve deficit  He exhibits normal muscle tone  Coordination normal    Skin: Skin is warm and dry  Nursing note and vitals reviewed        ED Medications  Medications   cefTRIAXone (ROCEPHIN) IVPB (premix) 2,000 mg (0 mg Intravenous Stopped 11/18/17 2311)   sodium chloride 0 9 % bolus 1,000 mL (0 mL Intravenous Stopped 11/18/17 2216)   ondansetron (ZOFRAN) injection 4 mg (4 mg Intravenous Given 11/18/17 2032)   ketorolac (TORADOL) 30 mg/mL injection 15 mg (15 mg Intravenous Given 11/18/17 2033)   azithromycin (ZITHROMAX) tablet 500 mg (500 mg Oral Given 11/18/17 2245)   ibuprofen (MOTRIN) tablet 800 mg (800 mg Oral Given 11/18/17 2245)       Diagnostic Studies  Results Reviewed     Procedure Component Value Units Date/Time    Comprehensive metabolic panel [[id number]] Collected:  11/18/17 2027    Lab Status:  Final result Specimen:  Blood from Arm, Right Updated:  11/18/17 2051     Sodium 140 mmol/L      Potassium 3 9 mmol/L      Chloride 104 mmol/L      CO2 26 mmol/L      Anion Gap 10 mmol/L      BUN 11 mg/dL      Creatinine 0 98 mg/dL      Glucose 105 mg/dL      Calcium 9 0 mg/dL      AST 26 U/L      ALT 42 U/L      Alkaline Phosphatase 64 U/L      Total Protein 7 1 g/dL      Albumin 3 7 g/dL      Total Bilirubin 0 30 mg/dL      eGFR 93 ml/min/1 73sq m     Narrative:         National Kidney Disease Education Program recommendations are as follows:  GFR calculation is accurate only with a steady state creatinine  Chronic Kidney disease less than 60 ml/min/1 73 sq  meters  Kidney failure less than 15 ml/min/1 73 sq  meters  CBC and differential [[id number]]  (Abnormal) Collected:  11/18/17 2027    Lab Status:  Final result Specimen:  Blood from Arm, Right Updated:  11/18/17 2038     WBC 14 36 (H) Thousand/uL      RBC 4 74 Million/uL      Hemoglobin 15 1 g/dL      Hematocrit 44 1 %      MCV 93 fL      MCH 31 9 pg      MCHC 34 2 g/dL      RDW 13 3 %      MPV 10 6 fL      Platelets 831 Thousands/uL      nRBC 0 /100 WBCs      Neutrophils Relative 64 %      Lymphocytes Relative 26 %      Monocytes Relative 8 %      Eosinophils Relative 1 %      Basophils Relative 0 %      Neutrophils Absolute 9 19 (H) Thousands/µL      Lymphocytes Absolute 3 78 Thousands/µL      Monocytes Absolute 1 09 Thousand/µL      Eosinophils Absolute 0 17 Thousand/µL      Basophils Absolute 0 06 Thousands/µL     UA w Reflex to Microscopic w Reflex to Culture [[id number]]  (Abnormal) Collected:  11/18/17 2028    Lab Status:  Final result Specimen:  Urine from Urine, Clean Catch Updated:  11/18/17 2037     Color, UA Yellow     Clarity, UA Clear     Specific Gravity, UA 1 025     pH, UA 5 5     Leukocytes, UA Negative     Nitrite, UA Negative     Protein, UA Negative mg/dl      Glucose, UA Negative mg/dl      Ketones, UA Trace (A) mg/dl      Urobilinogen, UA 0 2 E U /dl      Bilirubin, UA Negative     Blood, UA Negative                 XR chest 2 views   ED Interpretation by Diana Gonzalez DO (11/18 2311)   Pneumonitis RLL      CT renal stone study abdomen pelvis without contrast   Final Result by Mike Ortiz MD (11/18 2057)      1  No obstructive uropathy  2   Tree-in-bud opacity in the right lower lobe  While this can be seen with acute infectious or inflammatory pneumonitis, this may have been present previously, partially imaged   A chronic inflammatory or infectious etiology versus smoking related    pneumonitis such as RB-ILD is also possible  Workstation performed: [id number]                    Procedures  Procedures       Phone Contacts  ED Phone Contact    ED Course  ED Course as of Nov 18 2344   Sat Nov 18, 2017   2227 WBC: (!) 14 36      10:30 p m :  Re-examined patient  Patient is lying comfortably on the stretcher he states he feels much better  I reviewed the chest x-ray and the labs with the patient  I encourage patient to stop smoking and to follow up with the pulmonologist as well as his family doctor  I provided material for him for smoking cessation  Patient states he smokes about 2 and half packs of cigarettes a day  Patient felt much better prior to discharge in place and patient was placed on Zithromax and a high dose Motrin for the pain  MDM  CritCare Time     Differential diagnosis includes:  1  Back pain  2  Right flank pain  3  Rule out renal colic  4  Renal calculus  5  UTI  6  Dysuria  7  Chronic back pain    Disposition  Final diagnoses:   Right lower lobe pneumonitis ([address] )     Time reflects when diagnosis was documented in both MDM as applicable and the Disposition within this note     Time User Action Codes Description Comment    11/18/2017 10:36 PM Sherl Blocker Add [J18 1] Right lower lobe pneumonitis [facility])       ED Disposition     ED Disposition Condition Comment    Discharge  Tierney Celestin discharge to home/self care  Condition at discharge: Good        Follow-up Information     Follow up With Specialties Details Why Darleen Brownlee MD  In 2 days  [address]  [place] [address]      Billy Lawler MD Pulmonology, Neurology, Pulmonary Disease, Sleep Medicine In 3 days  [address] [address]  [phone]          Discharge Medication List as of 11/18/2017 10:49 PM      START taking these medications    Details azithromycin (ZITHROMAX) 250 mg tablet Take 1 tablet by mouth daily for 4 days, Starting Sat 11/18/2017, Until Wed 11/22/2017, Print         CONTINUE these medications which have NOT CHANGED    Details   allopurinol (ZYLOPRIM) 300 mg tablet Take 300 mg by mouth daily, Until Discontinued, Historical Med      losartan (COZAAR) 100 MG tablet Take 100 mg by mouth daily, Until Discontinued, Historical Med      methocarbamol (ROBAXIN) 750 mg tablet Take 750 mg by mouth 4 (four) times a day, Until Discontinued, Historical Med           No discharge procedures on file      ED Provider  Electronically Signed by           Coleman Bone,   11/18/17 0440

## 2018-01-19 ENCOUNTER — GENERIC CONVERSION - ENCOUNTER (OUTPATIENT)
Dept: OTHER | Facility: OTHER | Age: 48
End: 2018-01-19

## 2018-01-22 ENCOUNTER — TRANSCRIBE ORDERS (OUTPATIENT)
Dept: ADMINISTRATIVE | Facility: HOSPITAL | Age: 48
End: 2018-01-22

## 2018-01-22 DIAGNOSIS — R93.89 ABNORMAL CHEST CT: Primary | ICD-10-CM

## 2018-01-22 DIAGNOSIS — R06.02 SHORTNESS OF BREATH: ICD-10-CM

## 2018-01-24 VITALS
HEIGHT: 67 IN | BODY MASS INDEX: 34.75 KG/M2 | WEIGHT: 221.38 LBS | DIASTOLIC BLOOD PRESSURE: 90 MMHG | OXYGEN SATURATION: 95 % | HEART RATE: 87 BPM | SYSTOLIC BLOOD PRESSURE: 124 MMHG

## 2018-01-26 ENCOUNTER — TELEPHONE (OUTPATIENT)
Dept: PULMONOLOGY | Facility: CLINIC | Age: 48
End: 2018-01-26

## 2018-01-26 DIAGNOSIS — R06.2 WHEEZING: ICD-10-CM

## 2018-01-26 DIAGNOSIS — R06.02 SHORTNESS OF BREATH: Primary | ICD-10-CM

## 2018-01-26 RX ORDER — ALBUTEROL SULFATE 1.25 MG/3ML
1 SOLUTION RESPIRATORY (INHALATION) EVERY 6 HOURS PRN
Qty: 90 VIAL | Refills: 3 | Status: SHIPPED | OUTPATIENT
Start: 2018-01-26 | End: 2018-07-16

## 2018-01-26 RX ORDER — ALBUTEROL SULFATE 90 UG/1
2 AEROSOL, METERED RESPIRATORY (INHALATION) EVERY 4 HOURS PRN
Qty: 8 G | Refills: 3 | Status: SHIPPED | OUTPATIENT
Start: 2018-01-26 | End: 2018-07-16 | Stop reason: SDUPTHER

## 2018-01-26 NOTE — TELEPHONE ENCOUNTER
PT never got medication that was sent to the pharmacy  PT's wife came into the office today 1/26/18 asking for a written script to take to [facility] pharmacy however with Epic I am unable to do that  If you could please send rx for Albuterol Sulfate (2 5 MG/3ML) 0 083% Inhalation Neb Solution     30 Days 1080ML 3-Refills    And Ventonlin  (90 Base)  330 days 1x8GM inhaler and 3 refills

## 2018-02-09 ENCOUNTER — HOSPITAL ENCOUNTER (OUTPATIENT)
Dept: CT IMAGING | Facility: HOSPITAL | Age: 48
Discharge: HOME | End: 2018-02-09
Attending: INTERNAL MEDICINE
Payer: COMMERCIAL

## 2018-02-09 ENCOUNTER — HOSPITAL ENCOUNTER (OUTPATIENT)
Dept: PULMONOLOGY | Facility: HOSPITAL | Age: 48
Discharge: HOME | End: 2018-02-09
Attending: INTERNAL MEDICINE
Payer: COMMERCIAL

## 2018-02-09 DIAGNOSIS — R93.89 ABNORMAL CHEST CT: ICD-10-CM

## 2018-02-09 DIAGNOSIS — R06.02 SHORTNESS OF BREATH: ICD-10-CM

## 2018-02-09 PROCEDURE — 94060 EVALUATION OF WHEEZING: CPT | Performed by: INTERNAL MEDICINE

## 2018-02-09 PROCEDURE — 94727 GAS DIL/WSHOT DETER LNG VOL: CPT

## 2018-02-09 PROCEDURE — 94060 EVALUATION OF WHEEZING: CPT

## 2018-02-09 PROCEDURE — 94729 DIFFUSING CAPACITY: CPT | Performed by: INTERNAL MEDICINE

## 2018-02-09 PROCEDURE — 94729 DIFFUSING CAPACITY: CPT

## 2018-02-09 PROCEDURE — 71250 CT THORAX DX C-: CPT

## 2018-02-09 PROCEDURE — 94760 N-INVAS EAR/PLS OXIMETRY 1: CPT

## 2018-02-09 PROCEDURE — 94726 PLETHYSMOGRAPHY LUNG VOLUMES: CPT | Performed by: INTERNAL MEDICINE

## 2018-02-09 RX ORDER — ALBUTEROL SULFATE 2.5 MG/3ML
2.5 SOLUTION RESPIRATORY (INHALATION) ONCE
Status: COMPLETED | OUTPATIENT
Start: 2018-02-09 | End: 2018-02-09

## 2018-02-09 RX ADMIN — ALBUTEROL SULFATE 2.5 MG: 2.5 SOLUTION RESPIRATORY (INHALATION) at 14:05

## 2018-02-16 ENCOUNTER — OFFICE VISIT (OUTPATIENT)
Dept: PULMONOLOGY | Facility: CLINIC | Age: 48
End: 2018-02-16
Payer: COMMERCIAL

## 2018-02-16 VITALS
WEIGHT: 223 LBS | BODY MASS INDEX: 35.84 KG/M2 | HEART RATE: 78 BPM | OXYGEN SATURATION: 98 % | SYSTOLIC BLOOD PRESSURE: 136 MMHG | HEIGHT: 66 IN | DIASTOLIC BLOOD PRESSURE: 80 MMHG

## 2018-02-16 DIAGNOSIS — G47.33 OSA (OBSTRUCTIVE SLEEP APNEA): ICD-10-CM

## 2018-02-16 DIAGNOSIS — R06.02 SOB (SHORTNESS OF BREATH): ICD-10-CM

## 2018-02-16 DIAGNOSIS — G47.00 INSOMNIA, UNSPECIFIED TYPE: ICD-10-CM

## 2018-02-16 DIAGNOSIS — R93.89 ABNORMAL CHEST CT: ICD-10-CM

## 2018-02-16 DIAGNOSIS — Z72.0 TOBACCO ABUSE: ICD-10-CM

## 2018-02-16 DIAGNOSIS — J41.0 SIMPLE CHRONIC BRONCHITIS (HCC): Primary | ICD-10-CM

## 2018-02-16 PROBLEM — J42 CHRONIC BRONCHITIS (HCC): Status: ACTIVE | Noted: 2018-01-19

## 2018-02-16 PROCEDURE — 99214 OFFICE O/P EST MOD 30 MIN: CPT | Performed by: PHYSICIAN ASSISTANT

## 2018-02-16 RX ORDER — ZOLPIDEM TARTRATE 10 MG/1
10 TABLET ORAL
Qty: 30 TABLET | Refills: 0 | Status: SHIPPED | OUTPATIENT
Start: 2018-02-16 | End: 2018-03-13 | Stop reason: SDUPTHER

## 2018-02-16 RX ORDER — FLUTICASONE FUROATE AND VILANTEROL 100; 25 UG/1; UG/1
1 POWDER RESPIRATORY (INHALATION) DAILY
Qty: 1 EACH | Refills: 3 | Status: SHIPPED | OUTPATIENT
Start: 2018-02-16 | End: 2018-07-16 | Stop reason: SDUPTHER

## 2018-02-16 NOTE — PATIENT INSTRUCTIONS
Will start Breo 1 puff daily, rinse mouth after use  Continue albuterol 3 - 4 times per day as needed  Complete sleep study  Start Ambien at night as needed for insomnia  Be sure to have at least 8 hours devoted to sleep befoore taking  Don't drive or operate machinery while taking it  If any side effcts please let us know

## 2018-02-16 NOTE — PROGRESS NOTES
Assessment:    1  Simple chronic bronchitis (HCC)  fluticasone furoate-vilanterol (BREO ELLIPTA)    DISCONTINUED: Umeclidinium-Vilanterol (ANORO ELLIPTA) 62 5-25 MCG/INH AEPB   2  SOB (shortness of breath)     3  Abnormal chest CT     4  Insomnia, unspecified type  zolpidem (AMBIEN) 10 mg tablet    Diagnostic Sleep Study   5  MOISES (obstructive sleep apnea)  Diagnostic Sleep Study     CT of chest performed on 2/9 without contrast revealed emphysema with multiple small nodules measuring 4 and 5 mm  Pulmonary function tests: FEV1: 1 48L  (41 % predicted), FVC:  2 62L (59 % predicted), FEV1/FVC:  70%, DLCO: 74%      Plan:     COPD/chronic bronchitis - No wheezing on today's exam  Will continue with albuterol 3-4 times per day as needed  He did not get the Patience Nelson after last visit  He has tried Anoro in the past which significantly improved his breathing but he has vision problems with it  Will give him Breo to use 1 puff daily  Reviewed CT scan with emphysema and small nodules  Given smoking history will need follow up in 1 year per guidelines  Insomnia/MOISES - Suspicion for sleep apnea given night time awakenings, excessive daytime sleepiness  Will order in lab sleep study  Will also give him Ambien to help him sleep at night  Told him to be sure he has 8 hours to sleep  Will let us know if any side effects  Tobacco abuse - He is not currently interested in quitting  Follow up in 4-6 weeks once sleep study done, sooner if necessary  Subjective:     Patient ID: Dayna Platt is a 41 y o  male  Chief Complaint:  Patient is a 42 yo current smoker of about 2 PPD, 40 pack year smoking history also worked in construction  He was treated in November for pneumonia, recently given prednisone taper and started on nebs  He is here today for follow up  He is still having some GONZALEZ and cough  He is using the albuterol 3 times per day  He did not get the Patience Nelson so is not on any long acting meds   He is also complaining of insomnia, has a hard time falling and staying asleep  He does snore at night, has witnessed apneic episodes  He is tired during the day, can easily fall asleep during the day  Review of Systems   Constitutional: Negative  HENT: Negative  Respiratory: Positive for cough and shortness of breath  Cardiovascular: Negative  Gastrointestinal: Negative  Genitourinary: Negative  Musculoskeletal: Positive for back pain  Skin: Negative  Allergic/Immunologic: Negative  Neurological: Negative  Psychiatric/Behavioral: Negative  Objective:    Physical Exam   Constitutional: He is oriented to person, place, and time  He appears well-developed and well-nourished  No distress  HENT:   Mouth/Throat: Oropharynx is clear and moist    Eyes: Pupils are equal, round, and reactive to light  Cardiovascular: Normal rate, regular rhythm and normal heart sounds  No murmur heard  Pulmonary/Chest: Effort normal    Diminished breath sounds bilaterally, no wheezing or rhonchi   Musculoskeletal: Normal range of motion  Neurological: He is alert and oriented to person, place, and time  Skin: Skin is warm and dry  Psychiatric: He has a normal mood and affect   His behavior is normal  Judgment and thought content normal

## 2018-03-12 ENCOUNTER — TELEPHONE (OUTPATIENT)
Dept: PULMONOLOGY | Facility: CLINIC | Age: 48
End: 2018-03-12

## 2018-03-13 ENCOUNTER — TELEPHONE (OUTPATIENT)
Dept: PULMONOLOGY | Facility: CLINIC | Age: 48
End: 2018-03-13

## 2018-03-13 DIAGNOSIS — G47.00 INSOMNIA, UNSPECIFIED TYPE: ICD-10-CM

## 2018-03-13 DIAGNOSIS — G47.33 OSA (OBSTRUCTIVE SLEEP APNEA): Primary | ICD-10-CM

## 2018-03-13 RX ORDER — ZOLPIDEM TARTRATE 10 MG/1
10 TABLET ORAL
Qty: 30 TABLET | Refills: 0 | OUTPATIENT
Start: 2018-03-13 | End: 2018-04-02 | Stop reason: SDUPTHER

## 2018-03-13 NOTE — TELEPHONE ENCOUNTER
I called in 1 month  I spoke with patient about scheduling sleep study and he states his insurance denied it  I do not see documentation in the chart about it   I told him I will order a home sleep study and see if that is covered

## 2018-03-13 NOTE — TELEPHONE ENCOUNTER
Olivia Zamora the pt wife left a voicemail saying the patient needs medication, however she would like a phone call to discuss what is needed      Thank you

## 2018-03-14 ENCOUNTER — TELEPHONE (OUTPATIENT)
Dept: PULMONOLOGY | Facility: CLINIC | Age: 48
End: 2018-03-14

## 2018-03-14 NOTE — TELEPHONE ENCOUNTER
A letter was mailed to pt explaining that we are not PAR with KISHORE MESSINA [facility] (medical assistance)  I also left a message for him to call us concerning this  Luis STONE

## 2018-04-02 ENCOUNTER — TRANSCRIBE ORDERS (OUTPATIENT)
Dept: ADMINISTRATIVE | Facility: HOSPITAL | Age: 48
End: 2018-04-02

## 2018-04-02 DIAGNOSIS — R10.9 ABDOMINAL PAIN, UNSPECIFIED ABDOMINAL LOCATION: Primary | ICD-10-CM

## 2018-04-02 DIAGNOSIS — G47.00 INSOMNIA, UNSPECIFIED TYPE: ICD-10-CM

## 2018-04-02 DIAGNOSIS — K46.9 ABDOMINAL HERNIA WITHOUT OBSTRUCTION AND WITHOUT GANGRENE, RECURRENCE NOT SPECIFIED, UNSPECIFIED HERNIA TYPE: ICD-10-CM

## 2018-04-03 RX ORDER — ZOLPIDEM TARTRATE 10 MG/1
10 TABLET ORAL
Qty: 30 TABLET | Refills: 0 | OUTPATIENT
Start: 2018-04-03 | End: 2018-07-16 | Stop reason: ALTCHOICE

## 2018-04-03 NOTE — TELEPHONE ENCOUNTER
This needs to be signed by PA or  before we can close it  It was called to pharmacy yesterday by Dora Ndiaye  Thank you

## 2018-06-03 ENCOUNTER — HOSPITAL ENCOUNTER (EMERGENCY)
Facility: HOSPITAL | Age: 48
Discharge: HOME | End: 2018-06-03
Admitting: EMERGENCY MEDICINE
Payer: COMMERCIAL

## 2018-06-03 ENCOUNTER — APPOINTMENT (EMERGENCY)
Dept: CT IMAGING | Facility: HOSPITAL | Age: 48
End: 2018-06-03
Payer: COMMERCIAL

## 2018-06-03 ENCOUNTER — APPOINTMENT (EMERGENCY)
Dept: RADIOLOGY | Facility: HOSPITAL | Age: 48
End: 2018-06-03
Payer: COMMERCIAL

## 2018-06-03 VITALS
RESPIRATION RATE: 19 BRPM | OXYGEN SATURATION: 100 % | HEART RATE: 88 BPM | TEMPERATURE: 97.4 F | DIASTOLIC BLOOD PRESSURE: 80 MMHG | SYSTOLIC BLOOD PRESSURE: 146 MMHG

## 2018-06-03 DIAGNOSIS — M54.32 SCIATICA OF LEFT SIDE: Primary | ICD-10-CM

## 2018-06-03 LAB
ALBUMIN SERPL BCP-MCNC: 3.8 G/DL (ref 3.5–5)
ALP SERPL-CCNC: 79 U/L (ref 46–116)
ALT SERPL W P-5'-P-CCNC: 35 U/L (ref 12–78)
ANION GAP SERPL CALCULATED.3IONS-SCNC: 8 MMOL/L (ref 4–13)
AST SERPL W P-5'-P-CCNC: 22 U/L (ref 5–45)
BASOPHILS # BLD AUTO: 0.04 THOUSANDS/ΜL (ref 0–0.1)
BASOPHILS NFR BLD AUTO: 0 % (ref 0–1)
BILIRUB SERPL-MCNC: 0.4 MG/DL (ref 0.2–1)
BILIRUB UR QL STRIP: NEGATIVE
BUN SERPL-MCNC: 8 MG/DL (ref 5–25)
CALCIUM SERPL-MCNC: 9.5 MG/DL (ref 8.3–10.1)
CHLORIDE SERPL-SCNC: 102 MMOL/L (ref 100–108)
CLARITY UR: CLEAR
CO2 SERPL-SCNC: 30 MMOL/L (ref 21–32)
COLOR UR: YELLOW
CREAT SERPL-MCNC: 1.07 MG/DL (ref 0.6–1.3)
EOSINOPHIL # BLD AUTO: 0.2 THOUSAND/ΜL (ref 0–0.61)
EOSINOPHIL NFR BLD AUTO: 2 % (ref 0–6)
ERYTHROCYTE [DISTWIDTH] IN BLOOD BY AUTOMATED COUNT: 13.9 % (ref 11.6–15.1)
GFR SERPL CREATININE-BSD FRML MDRD: 83 ML/MIN/1.73SQ M
GLUCOSE SERPL-MCNC: 140 MG/DL (ref 65–140)
GLUCOSE UR STRIP-MCNC: NEGATIVE MG/DL
HCT VFR BLD AUTO: 48.1 % (ref 36.5–49.3)
HGB BLD-MCNC: 16 G/DL (ref 12–17)
HGB UR QL STRIP.AUTO: NEGATIVE
IMM GRANULOCYTES # BLD AUTO: 0.07 THOUSAND/UL (ref 0–0.2)
IMM GRANULOCYTES NFR BLD AUTO: 1 % (ref 0–2)
KETONES UR STRIP-MCNC: NEGATIVE MG/DL
LEUKOCYTE ESTERASE UR QL STRIP: NEGATIVE
LYMPHOCYTES # BLD AUTO: 2.69 THOUSANDS/ΜL (ref 0.6–4.47)
LYMPHOCYTES NFR BLD AUTO: 25 % (ref 14–44)
MCH RBC QN AUTO: 31.9 PG (ref 26.8–34.3)
MCHC RBC AUTO-ENTMCNC: 33.3 G/DL (ref 31.4–37.4)
MCV RBC AUTO: 96 FL (ref 82–98)
MONOCYTES # BLD AUTO: 0.84 THOUSAND/ΜL (ref 0.17–1.22)
MONOCYTES NFR BLD AUTO: 8 % (ref 4–12)
NEUTROPHILS # BLD AUTO: 6.83 THOUSANDS/ΜL (ref 1.85–7.62)
NEUTS SEG NFR BLD AUTO: 64 % (ref 43–75)
NITRITE UR QL STRIP: NEGATIVE
NRBC BLD AUTO-RTO: 0 /100 WBCS
PH UR STRIP.AUTO: 8 [PH] (ref 4.5–8)
PLATELET # BLD AUTO: 327 THOUSANDS/UL (ref 149–390)
PMV BLD AUTO: 10.6 FL (ref 8.9–12.7)
POTASSIUM SERPL-SCNC: 4.2 MMOL/L (ref 3.5–5.3)
PROT SERPL-MCNC: 7.6 G/DL (ref 6.4–8.2)
PROT UR STRIP-MCNC: NEGATIVE MG/DL
RBC # BLD AUTO: 5.01 MILLION/UL (ref 3.88–5.62)
SODIUM SERPL-SCNC: 140 MMOL/L (ref 136–145)
SP GR UR STRIP.AUTO: 1.01 (ref 1–1.03)
UROBILINOGEN UR QL STRIP.AUTO: 0.2 E.U./DL
WBC # BLD AUTO: 10.67 THOUSAND/UL (ref 4.31–10.16)

## 2018-06-03 PROCEDURE — 81003 URINALYSIS AUTO W/O SCOPE: CPT | Performed by: PHYSICIAN ASSISTANT

## 2018-06-03 PROCEDURE — 85025 COMPLETE CBC W/AUTO DIFF WBC: CPT

## 2018-06-03 PROCEDURE — 96361 HYDRATE IV INFUSION ADD-ON: CPT

## 2018-06-03 PROCEDURE — 74176 CT ABD & PELVIS W/O CONTRAST: CPT

## 2018-06-03 PROCEDURE — 99284 EMERGENCY DEPT VISIT MOD MDM: CPT

## 2018-06-03 PROCEDURE — 36415 COLL VENOUS BLD VENIPUNCTURE: CPT

## 2018-06-03 PROCEDURE — 96374 THER/PROPH/DIAG INJ IV PUSH: CPT

## 2018-06-03 PROCEDURE — 96372 THER/PROPH/DIAG INJ SC/IM: CPT

## 2018-06-03 PROCEDURE — 80053 COMPREHEN METABOLIC PANEL: CPT

## 2018-06-03 RX ORDER — DIAZEPAM 5 MG/ML
5 INJECTION, SOLUTION INTRAMUSCULAR; INTRAVENOUS ONCE
Status: COMPLETED | OUTPATIENT
Start: 2018-06-03 | End: 2018-06-03

## 2018-06-03 RX ORDER — DIAZEPAM 5 MG/1
5 TABLET ORAL 2 TIMES DAILY
Qty: 8 TABLET | Refills: 0 | Status: SHIPPED | OUTPATIENT
Start: 2018-06-03 | End: 2018-07-16 | Stop reason: ALTCHOICE

## 2018-06-03 RX ADMIN — SODIUM CHLORIDE 1000 ML: 0.9 INJECTION, SOLUTION INTRAVENOUS at 14:28

## 2018-06-03 RX ADMIN — HYDROMORPHONE HYDROCHLORIDE 1 MG: 1 INJECTION, SOLUTION INTRAMUSCULAR; INTRAVENOUS; SUBCUTANEOUS at 14:13

## 2018-06-03 RX ADMIN — Medication 5 MG: at 14:15

## 2018-06-03 NOTE — DISCHARGE INSTRUCTIONS
Sciatica   WHAT YOU NEED TO KNOW:   Sciatica is a condition that causes pain along your sciatic nerve  The sciatic nerve runs from your spine through both sides of your buttocks  It then runs down the back of your thigh, into your lower leg and foot  Your sciatic nerve may be compressed, inflamed, irritated, or stretched  DISCHARGE INSTRUCTIONS:   Medicines:   · NSAIDs:  These medicines decrease swelling and pain  NSAIDs are available without a doctor's order  Ask your healthcare provider which medicine is right for you  Ask how much to take and when to take it  Take as directed  NSAIDs can cause stomach bleeding or kidney problems if not taken correctly  · Acetaminophen: This medicine decreases pain  Acetaminophen is available without a doctor's order  Ask how much to take and when to take it  Follow directions  Acetaminophen can cause liver damage if not taken correctly  · Muscle relaxers  help decrease pain and muscle spasms  · Take your medicine as directed  Contact your healthcare provider if you think your medicine is not helping or if you have side effects  Tell him of her if you are allergic to any medicine  Keep a list of the medicines, vitamins, and herbs you take  Include the amounts, and when and why you take them  Bring the list or the pill bottles to follow-up visits  Carry your medicine list with you in case of an emergency  Follow up with your healthcare provider as directed:  Write down your questions so you remember to ask them during your visits  Manage your symptoms:   · Activity:  Decrease your activity  Do not lift heavy objects or twist your back for at least 6 weeks  Slowly return to your usual activity  · Ice:  Ice helps decrease swelling and pain  Ice may also help prevent tissue damage  Use an ice pack, or put crushed ice in a plastic bag  Cover it with a towel and place it on your low back or leg for 15 to 20 minutes every hour or as directed      · Heat:  Heat helps decrease pain and muscle spasms  Apply heat on the area for 20 to 30 minutes every 2 hours for as many days as directed  · Physical therapy:  You may need to see physical therapist to teach you exercises to help improve movement and strength, and to decrease pain  An occupational therapist teaches you skills to help with your daily activities  · Use assistive devices if directed: You may need to wear back support, such as a back brace  You may need crutches, a cane, or a walker to decrease stress on your lower back and leg muscles  Ask your healthcare provider for more information about assistive devices and how to use them correctly  Self-care:   · Avoid pressure on your back and legs:  Do not  lift heavy objects, or stand or sit for long periods of time  · Lift objects safely:  Keep your back straight and bend your knees when you  an object  Do not bend or twist your back when you lift  · Maintain a healthy weight:  Ask your healthcare provider how much you should weigh  Ask him to help you create a weight loss plan if you are overweight  · Exercise:  Ask your healthcare provider about the best stretching, warmup, and exercise plan for you  Contact your healthcare provider if:   · You have pain in your lower back at night or when resting  · You have pain in your lower back with numbness below the knee  · You have weakness in one leg only  · You have questions or concerns about your condition or care  Return to the emergency department if:   · You have trouble holding back your urine or bowel movements  · You have weakness in both legs  · You have numbness in your groin or buttocks  © 2017 [employer] Information is for End User's use only and may not be sold, redistributed or otherwise used for commercial purposes  All illustrations and images included in CareNotes® are the copyrighted property of [email or web address]  or [employer]     The above information is an  only  It is not intended as medical advice for individual conditions or treatments  Talk to your doctor, nurse or pharmacist before following any medical regimen to see if it is safe and effective for you

## 2018-06-03 NOTE — ED PROVIDER NOTES
History  Chief Complaint   Patient presents with    Back Pain     pt co of L sided back pain radiatin to the butt, hx of surgery to it, pt also co of sob and wheezing pt hx of emphysema     Mor Cruz is a 57 y o  male w PMH gout, HTN who presents for evaluation of back pain  Patient w back pain that began abt a week ago  Located in lower back, worse on L side  There is pain radiating down the L leg w some numbness  Denies saddle anesthesia and bladder or bowel incontinence  Does have pain w defecation from bearing down  No dysuria or hematuria  Pain is in the L flank area and radiates around into the anterior aspect of abdomen  No prior kidney stone hx  Triage note mentioned respiratory sx and he does attest to cough and congestion ongoing for a week but sx are mild, no cp / tightness or sob  Prior to Admission Medications   Prescriptions Last Dose Informant Patient Reported? Taking?    albuterol (ACCUNEB) 1 25 MG/3ML nebulizer solution   No No   Sig: Take 3 mL by nebulization every 6 (six) hours as needed for wheezing   albuterol (VENTOLIN HFA) 90 mcg/act inhaler   No No   Sig: Inhale 2 puffs every 4 (four) hours as needed for wheezing   allopurinol (ZYLOPRIM) 300 mg tablet   Yes No   Sig: Take 300 mg by mouth daily   fluticasone furoate-vilanterol (BREO ELLIPTA)   No No   Sig: Inhale 1 puff daily   ibuprofen (MOTRIN) 800 mg tablet   No No   Sig: Take 1 tablet by mouth 2 (two) times a day for 10 days   losartan (COZAAR) 100 MG tablet   Yes No   Sig: Take 100 mg by mouth daily   methocarbamol (ROBAXIN) 750 mg tablet   Yes No   Sig: Take 750 mg by mouth 4 (four) times a day   zolpidem (AMBIEN) 10 mg tablet   No No   Sig: Take 1 tablet (10 mg total) by mouth daily at bedtime as needed for sleep      Facility-Administered Medications: None       Past Medical History:   Diagnosis Date    Gout     Hypertension        Past Surgical History:   Procedure Laterality Date    APPENDECTOMY      BACK SURGERY      JOINT REPLACEMENT      REPLACEMENT TOTAL KNEE Right        History reviewed  No pertinent family history  I have reviewed and agree with the history as documented  Social History   Substance Use Topics    Smoking status: Current Every Day Smoker     Packs/day: 2 00     Types: Cigarettes    Smokeless tobacco: Never Used    Alcohol use Yes      Comment: occasional        Review of Systems   Constitutional: Negative for activity change, chills, diaphoresis, fatigue and fever  HENT: Negative for congestion and rhinorrhea  Eyes: Negative for pain  Respiratory: Negative for cough, chest tightness, shortness of breath and wheezing  Cardiovascular: Negative for chest pain and palpitations  Gastrointestinal: Negative for abdominal distention, constipation, diarrhea, nausea and vomiting  Genitourinary: Negative for difficulty urinating and dysuria  Musculoskeletal: Positive for arthralgias  Negative for myalgias  Neurological: Negative for dizziness, weakness, light-headedness and headaches  Psychiatric/Behavioral: The patient is not nervous/anxious  Physical Exam  Physical Exam   Constitutional: He is oriented to person, place, and time  He appears well-developed and well-nourished  No distress  HENT:   Head: Normocephalic and atraumatic  Eyes: Pupils are equal, round, and reactive to light  Neck: Normal range of motion  Neck supple  No tracheal deviation present  Cardiovascular: Normal rate, regular rhythm, normal heart sounds and intact distal pulses  Exam reveals no gallop and no friction rub  No murmur heard  Pulmonary/Chest: Effort normal and breath sounds normal  No respiratory distress  He has no wheezes  He has no rales  Abdominal: Soft  Bowel sounds are normal  He exhibits no distension and no mass  There is no tenderness  There is no guarding  Left-sided CVA tenderness   Musculoskeletal: Normal range of motion  He exhibits no edema or deformity  Motor strength in lower extremities is equal bilaterally, 5/5  There is pain however with movement, repositioning on the stretcher, flexing and extending the back  There is pain to the lumbar aspect of the back  Pain into the paraspinal muscles, most notable on the left side  Sensation intact to light touch  Neurological: He is alert and oriented to person, place, and time  Skin: Skin is warm and dry  He is not diaphoretic  Psychiatric: He has a normal mood and affect  His behavior is normal    Nursing note and vitals reviewed        Vital Signs  ED Triage Vitals [06/03/18 1343]   Temperature Pulse Respirations Blood Pressure SpO2   (!) 97 4 °F (36 3 °C) 91 (!) 24 (!) 176/98 100 %      Temp Source Heart Rate Source Patient Position - Orthostatic VS BP Location FiO2 (%)   Oral Monitor Sitting Right arm --      Pain Score       Worst Possible Pain           Vitals:    06/03/18 1343 06/03/18 1449   BP: (!) 176/98 146/80   Pulse: 91 88   Patient Position - Orthostatic VS: Sitting Sitting       Visual Acuity      ED Medications  Medications   HYDROmorphone (DILAUDID) injection 1 mg (1 mg Intravenous Given 6/3/18 1413)   diazepam (VALIUM) injection 5 mg (5 mg Intramuscular Given 6/3/18 1415)   sodium chloride 0 9 % bolus 1,000 mL (0 mL Intravenous Stopped 6/3/18 1505)       Diagnostic Studies  Results Reviewed     Procedure Component Value Units Date/Time    UA w Reflex to Microscopic w Reflex to Culture [[id number]] Collected:  06/03/18 1449    Lab Status:  Final result Specimen:  Urine from Urine, Other Updated:  06/03/18 1456     Color, UA Yellow     Clarity, UA Clear     Specific Gravity, UA 1 015     pH, UA 8 0     Leukocytes, UA Negative     Nitrite, UA Negative     Protein, UA Negative mg/dl      Glucose, UA Negative mg/dl      Ketones, UA Negative mg/dl      Urobilinogen, UA 0 2 E U /dl      Bilirubin, UA Negative     Blood, UA Negative    Comprehensive metabolic panel [[id number]] Collected:  06/03/18 1352 Lab Status:  Final result Specimen:  Blood from Arm, Right Updated:  06/03/18 1415     Sodium 140 mmol/L      Potassium 4 2 mmol/L      Chloride 102 mmol/L      CO2 30 mmol/L      Anion Gap 8 mmol/L      BUN 8 mg/dL      Creatinine 1 07 mg/dL      Glucose 140 mg/dL      Calcium 9 5 mg/dL      AST 22 U/L      ALT 35 U/L      Alkaline Phosphatase 79 U/L      Total Protein 7 6 g/dL      Albumin 3 8 g/dL      Total Bilirubin 0 40 mg/dL      eGFR 83 ml/min/1 73sq m     Narrative:         National Kidney Disease Education Program recommendations are as follows:  GFR calculation is accurate only with a steady state creatinine  Chronic Kidney disease less than 60 ml/min/1 73 sq  meters  Kidney failure less than 15 ml/min/1 73 sq  meters  CBC and differential [[id number]]  (Abnormal) Collected:  06/03/18 1352    Lab Status:  Final result Specimen:  Blood from Arm, Right Updated:  06/03/18 1400     WBC 10 67 (H) Thousand/uL      RBC 5 01 Million/uL      Hemoglobin 16 0 g/dL      Hematocrit 48 1 %      MCV 96 fL      MCH 31 9 pg      MCHC 33 3 g/dL      RDW 13 9 %      MPV 10 6 fL      Platelets 187 Thousands/uL      nRBC 0 /100 WBCs      Neutrophils Relative 64 %      Immat GRANS % 1 %      Lymphocytes Relative 25 %      Monocytes Relative 8 %      Eosinophils Relative 2 %      Basophils Relative 0 %      Neutrophils Absolute 6 83 Thousands/µL      Immature Grans Absolute 0 07 Thousand/uL      Lymphocytes Absolute 2 69 Thousands/µL      Monocytes Absolute 0 84 Thousand/µL      Eosinophils Absolute 0 20 Thousand/µL      Basophils Absolute 0 04 Thousands/µL                  CT renal stone study abdomen pelvis wo contrast   Final Result by Veronica Vides MD (06/03 1441)         1  No obstructive uropathy  2   No acute abnormality the abdomen or pelvis               Workstation performed: [id number]                    Procedures  Procedures       Phone Contacts  ED Phone Contact    ED Course  ED Course as of Jun 03 1637 Renard Yusuf Jun 03, 2018   3506 Patient feels much improved  Pain down from 10/10 to 6/10  MDM  Number of Diagnoses or Management Options  Sciatica of left side:   Diagnosis management comments: DDX includes but not ltd to: Most likely this is muscle strain, sciatica  Not consistent with cauda equina and there is no ambulatory dysfunction  No focal deficits to suggest epidural hematoma or abscess  No fevers or chills or blood thinners to suggest this  No trauma to suggest fracture  Alternatively could be kidney stone given there is pain directly in the flank and CVA region that is radiating around to the anterior aspect of the abdomen  Plan is to obtain:  CT renal stone study to check for stone, inflammation, hydronephrosis  CBC to check for anemia, leukocytosis, hydration status  Chemistry panel to check for lyte abnormalities, organ function   UA for UTI, hydration status, hematuria     Based on results:  No visible kidney stone and has normal labs and urinary sample  Suspect that this is most likely muscle spasm related to back with sciatica given the pain radiating down the left leg  He can no longer follow up with surgeon from [place] [address]  so will refer him to spine pain management locally and to [address] [place] [address] Neurosurgery team     Based on a review of the [facility] database there is no evidence of controlled substance abuse or doctor shopping by this patient  Return parameters discussed  Pt requires f/u as an outpt  Pt expresses understanding w above treatment plan  All questions answered prior to d/c  Portions of the record may have been created with voice recognition software   Occasional wrong word or "sound a like" substitutions may have occurred due to the inherent limitations of voice recognition software   Read the chart carefully and recognize, using context, where substitutions have occurred        CritCare Time    Disposition  Final diagnoses: Sciatica of left side     Time reflects when diagnosis was documented in both MDM as applicable and the Disposition within this note     Time User Action Codes Description Comment    6/3/2018  2:59 PM Gail Whitman Add [M54 32] Sciatica of left side       ED Disposition     ED Disposition Condition Comment    Discharge  Yolande Jeter discharge to home/self care      Condition at discharge: Good        Follow-up Information     Follow up With Specialties Details Why Contact Info Additional Information    [address] [facility] Emergency Medicine  If symptoms worsen [address] [facility] [address]  [phone] [place] ED, [address] [place], [place], [address]    Stephanie Gee MD Pain Medicine Call in 1 day  [place] [id number] [phone] [address] [facility] Neurosurgery Call in 1 day As needed [address] [facility] [address] [phone]           Discharge Medication List as of 6/3/2018  3:19 PM      START taking these medications    Details   diazepam (VALIUM) 5 mg tablet Take 1 tablet (5 mg total) by mouth 2 (two) times a day for 8 doses, Starting Sun 6/3/2018, Until Thu 6/7/2018, Print      diclofenac sodium (VOLTAREN) 1 % Apply 2 g topically 4 (four) times a day for 30 days, Starting Sun 6/3/2018, Until Tue 7/3/2018, Print         CONTINUE these medications which have NOT CHANGED    Details   albuterol (ACCUNEB) 1 25 MG/3ML nebulizer solution Take 3 mL by nebulization every 6 (six) hours as needed for wheezing, Starting Fri 1/26/2018, Normal      albuterol (VENTOLIN HFA) 90 mcg/act inhaler Inhale 2 puffs every 4 (four) hours as needed for wheezing, Starting Fri 1/26/2018, Normal      allopurinol (ZYLOPRIM) 300 mg tablet Take 300 mg by mouth daily, Until Discontinued, Historical Med      fluticasone furoate-vilanterol (BREO ELLIPTA) Inhale 1 puff daily, Starting Fri 2/16/2018, Print      ibuprofen (MOTRIN) 800 mg tablet Take 1 tablet by mouth 2 (two) times a day for 10 days, Starting Sat 11/18/2017, Until Tue 11/28/2017, Print      losartan (COZAAR) 100 MG tablet Take 100 mg by mouth daily, Until Discontinued, Historical Med      methocarbamol (ROBAXIN) 750 mg tablet Take 750 mg by mouth 4 (four) times a day, Until Discontinued, Historical Med      zolpidem (AMBIEN) 10 mg tablet Take 1 tablet (10 mg total) by mouth daily at bedtime as needed for sleep, Starting Tue 4/3/2018, Phone In           No discharge procedures on file      ED Provider  Electronically Signed by           Guido Hester PA-C  06/03/18 6293

## 2018-06-05 ENCOUNTER — TELEPHONE (OUTPATIENT)
Dept: PAIN MEDICINE | Facility: CLINIC | Age: 48
End: 2018-06-05

## 2018-06-05 NOTE — TELEPHONE ENCOUNTER
Patients wife called and LM on anserve 06/05/18@ 1204pm stating patient changed his insurance to [employer]  and is calling back to schedule an appt  Called patient back who was informed to c/b when insurance becomes eff  Patient agreed

## 2018-06-05 NOTE — TELEPHONE ENCOUNTER
I called pt to ananda, er consult, as per Dr Siva Medina, but he has ins we don't take  He is going to switch to [employer] in July  He will call back to ananda np appt then

## 2018-06-05 NOTE — PROGRESS NOTES
I called pt to sched, but he has ins we don't take  He is going to switch to [employer] in July  He will call back to sched np appt then

## 2018-07-08 ENCOUNTER — HOSPITAL ENCOUNTER (EMERGENCY)
Facility: HOSPITAL | Age: 48
Discharge: HOME | End: 2018-07-08
Attending: EMERGENCY MEDICINE
Payer: COMMERCIAL

## 2018-07-08 ENCOUNTER — APPOINTMENT (EMERGENCY)
Dept: RADIOLOGY | Facility: HOSPITAL | Age: 48
End: 2018-07-08
Payer: COMMERCIAL

## 2018-07-08 VITALS
WEIGHT: 220.6 LBS | TEMPERATURE: 98.4 F | DIASTOLIC BLOOD PRESSURE: 88 MMHG | BODY MASS INDEX: 35.61 KG/M2 | OXYGEN SATURATION: 96 % | SYSTOLIC BLOOD PRESSURE: 167 MMHG | RESPIRATION RATE: 16 BRPM | HEART RATE: 90 BPM

## 2018-07-08 DIAGNOSIS — M25.571 RIGHT ANKLE PAIN: Primary | ICD-10-CM

## 2018-07-08 DIAGNOSIS — M10.9 GOUT: ICD-10-CM

## 2018-07-08 PROCEDURE — 73610 X-RAY EXAM OF ANKLE: CPT

## 2018-07-08 PROCEDURE — 99283 EMERGENCY DEPT VISIT LOW MDM: CPT

## 2018-07-08 PROCEDURE — 96372 THER/PROPH/DIAG INJ SC/IM: CPT

## 2018-07-08 RX ORDER — INDOMETHACIN 25 MG/1
50 CAPSULE ORAL 3 TIMES DAILY
Qty: 30 CAPSULE | Refills: 0 | Status: SHIPPED | OUTPATIENT
Start: 2018-07-08 | End: 2018-07-16 | Stop reason: SDUPTHER

## 2018-07-08 RX ORDER — OXYCODONE HYDROCHLORIDE AND ACETAMINOPHEN 5; 325 MG/1; MG/1
1 TABLET ORAL EVERY 8 HOURS PRN
Qty: 10 TABLET | Refills: 0 | Status: SHIPPED | OUTPATIENT
Start: 2018-07-08 | End: 2018-09-07

## 2018-07-08 RX ORDER — PREDNISONE 20 MG/1
20 TABLET ORAL DAILY
Qty: 15 TABLET | Refills: 0 | Status: SHIPPED | OUTPATIENT
Start: 2018-07-08 | End: 2018-07-13

## 2018-07-08 RX ORDER — INDOMETHACIN 25 MG/1
25 CAPSULE ORAL ONCE
Status: COMPLETED | OUTPATIENT
Start: 2018-07-08 | End: 2018-07-08

## 2018-07-08 RX ADMIN — HYDROMORPHONE HYDROCHLORIDE 1 MG: 1 INJECTION, SOLUTION INTRAMUSCULAR; INTRAVENOUS; SUBCUTANEOUS at 20:10

## 2018-07-08 RX ADMIN — INDOMETHACIN 25 MG: 25 CAPSULE ORAL at 20:00

## 2018-07-08 NOTE — ED PROVIDER NOTES
History  Chief Complaint   Patient presents with    Ankle Swelling     pt co of R ankle pain and swelling onset this am, pt hx of gout      16-year-old male with pain in the lateral malleolus of his right ankle  Notes that has increased swelling  States it feels just like gout but he has never had gout in his ankle, only had gout in his toe  No fevers chills nausea vomiting  He wonders if he could have broken something  He does note he grilled this weekend and had beer and was eating meat  Again states it feels just like gout but concerned that he has never had in his ankle  He has been able bear weight  He is able to range but has significant pain in doing so  No injuries he can recall  No breaks in the skin  No recent illnesses  History provided by:  Patient   used: No    Ankle Pain   Location:  Ankle  Time since incident:  1 day  Injury: no    Ankle location:  R ankle  Pain details:     Quality:  Burning and throbbing    Radiates to:  Does not radiate    Severity:  Severe    Onset quality:  Sudden    Duration:  1 day    Timing:  Constant    Progression:  Unchanged  Chronicity:  New  Dislocation: no    Foreign body present:  No foreign bodies  Tetanus status:  Unknown  Prior injury to area:  No  Relieved by:  Rest  Worsened by:  Bearing weight and extension  Ineffective treatments:  None tried  Associated symptoms: decreased ROM and swelling (Lateral malleolus right ankle)    Associated symptoms: no back pain, no fatigue, no fever, no itching, no muscle weakness, no neck pain, no numbness, no stiffness and no tingling    Risk factors: obesity    Risk factors: no concern for non-accidental trauma, no frequent fractures, no known bone disorder and no recent illness        Prior to Admission Medications   Prescriptions Last Dose Informant Patient Reported? Taking?    albuterol (ACCUNEB) 1 25 MG/3ML nebulizer solution   No No   Sig: Take 3 mL by nebulization every 6 (six) hours as needed for wheezing   albuterol (VENTOLIN HFA) 90 mcg/act inhaler   No No   Sig: Inhale 2 puffs every 4 (four) hours as needed for wheezing   allopurinol (ZYLOPRIM) 300 mg tablet   Yes No   Sig: Take 300 mg by mouth daily   diazepam (VALIUM) 5 mg tablet   No No   Sig: Take 1 tablet (5 mg total) by mouth 2 (two) times a day for 8 doses   diclofenac sodium (VOLTAREN) 1 %   No No   Sig: Apply 2 g topically 4 (four) times a day for 30 days   fluticasone furoate-vilanterol (BREO ELLIPTA)   No No   Sig: Inhale 1 puff daily   ibuprofen (MOTRIN) 800 mg tablet   No No   Sig: Take 1 tablet by mouth 2 (two) times a day for 10 days   losartan (COZAAR) 100 MG tablet   Yes No   Sig: Take 100 mg by mouth daily   methocarbamol (ROBAXIN) 750 mg tablet   Yes No   Sig: Take 750 mg by mouth 4 (four) times a day   zolpidem (AMBIEN) 10 mg tablet   No No   Sig: Take 1 tablet (10 mg total) by mouth daily at bedtime as needed for sleep      Facility-Administered Medications: None       Past Medical History:   Diagnosis Date    Gout     Hypertension        Past Surgical History:   Procedure Laterality Date    APPENDECTOMY      BACK SURGERY      JOINT REPLACEMENT      REPLACEMENT TOTAL KNEE Right        History reviewed  No pertinent family history  I have reviewed and agree with the history as documented  Social History   Substance Use Topics    Smoking status: Current Every Day Smoker     Packs/day: 2 00     Types: Cigarettes    Smokeless tobacco: Never Used    Alcohol use Yes      Comment: occasional        Review of Systems   Constitutional: Negative for activity change, appetite change, chills, diaphoresis, fatigue, fever and unexpected weight change  HENT: Negative for congestion, rhinorrhea, sinus pressure, sore throat and trouble swallowing  Eyes: Negative for photophobia and visual disturbance  Respiratory: Negative for apnea, cough, choking, chest tightness, shortness of breath, wheezing and stridor  Cardiovascular: Negative for chest pain, palpitations and leg swelling  Gastrointestinal: Negative for abdominal distention, abdominal pain, blood in stool, constipation, diarrhea, nausea and vomiting  Genitourinary: Negative for decreased urine volume, difficulty urinating, dysuria, enuresis, flank pain, frequency, hematuria and urgency  Musculoskeletal: Negative for arthralgias, back pain, myalgias, neck pain, neck stiffness and stiffness  Right ankle pain   Skin: Negative for color change, itching, pallor, rash and wound  Allergic/Immunologic: Negative  Neurological: Negative for dizziness, tremors, syncope, weakness, light-headedness, numbness and headaches  Hematological: Negative  Psychiatric/Behavioral: Negative  All other systems reviewed and are negative  Physical Exam  Physical Exam   Constitutional: He is oriented to person, place, and time  He appears well-developed and well-nourished  Non-toxic appearance  He does not have a sickly appearance  He does not appear ill  No distress  HENT:   Head: Normocephalic and atraumatic  Eyes: EOM and lids are normal  Pupils are equal, round, and reactive to light  Neck: Normal range of motion  Neck supple  Cardiovascular: Normal rate, regular rhythm, S1 normal, S2 normal, normal heart sounds, intact distal pulses and normal pulses  Exam reveals no gallop, no distant heart sounds, no friction rub and no decreased pulses  No murmur heard  Pulses:       Radial pulses are 2+ on the right side, and 2+ on the left side  Dorsalis pedis pulses are 2+ on the right side  Pulmonary/Chest: Effort normal and breath sounds normal  No accessory muscle usage  No apnea, no tachypnea and no bradypnea  No respiratory distress  He has no decreased breath sounds  He has no wheezes  He has no rhonchi  He has no rales  Abdominal: Soft  Normal appearance  He exhibits no distension and no mass  There is no tenderness   There is no rigidity, no rebound and no guarding  No hernia  Musculoskeletal: He exhibits no edema or deformity  Right ankle: He exhibits decreased range of motion and swelling  He exhibits no ecchymosis, no deformity, no laceration and normal pulse  Tenderness  Lateral malleolus tenderness found  No medial malleolus, no AITFL, no CF ligament, no posterior TFL, no head of 5th metatarsal and no proximal fibula tenderness found  Achilles tendon normal         Feet:    Neurological: He is alert and oriented to person, place, and time  No cranial nerve deficit  GCS eye subscore is 4  GCS verbal subscore is 5  GCS motor subscore is 6  Skin: Skin is warm, dry and intact  No rash noted  He is not diaphoretic  No erythema  No pallor  Psychiatric: His speech is normal    Nursing note and vitals reviewed        Vital Signs  ED Triage Vitals [07/08/18 1857]   Temperature Pulse Respirations Blood Pressure SpO2   98 4 °F (36 9 °C) (!) 109 16 (!) 172/94 96 %      Temp src Heart Rate Source Patient Position - Orthostatic VS BP Location FiO2 (%)   -- Monitor Sitting Right arm --      Pain Score       Worst Possible Pain           Vitals:    07/08/18 1857 07/08/18 2041   BP: (!) 172/94 167/88   Pulse: (!) 109 90   Patient Position - Orthostatic VS: Sitting Sitting       Visual Acuity      ED Medications  Medications   HYDROmorphone (DILAUDID) injection 1 mg (1 mg Intramuscular Given 7/8/18 2010)   indomethacin (INDOCIN) capsule 25 mg (25 mg Oral Given 7/8/18 2000)       Diagnostic Studies  Results Reviewed     None                 XR ankle 3+ vw right    (Results Pending)              Procedures  Procedures       Phone Contacts  ED Phone Contact    ED Course                               MDM  Number of Diagnoses or Management Options  Gout: new and requires workup  Right ankle pain: new and requires workup  Diagnosis management comments: Differential diagnosis including but not limited to: sprain, strain, fracture, dislocation, contusion, gout, doubt septic arthritis  Plan:  X-ray, analgesia  Disposition pending  We discussed the potential for septic arthritis  He is otherwise low risk  He feels this is similar to his prior gout  Will treat as gout if no fracture  He will return if symptoms are not improving or if he has worsening pain or fevers to suggest septic arthritis       Amount and/or Complexity of Data Reviewed  Tests in the radiology section of CPT®: ordered and reviewed  Independent visualization of images, tracings, or specimens: yes    Risk of Complications, Morbidity, and/or Mortality  Presenting problems: low  Management options: low  General comments: 53-year-old male with right lateral malleolus pain  Trace swelling there  No fluid collection  He is otherwise low risk for septic arthritis  He feels like this is similar to his gout  At this point time with having similar pain to his gout similar presentation we will treat as such  He would like to defer any attempt at aspiration at this time  Who return in 2-3 days if his symptoms are worsening for re-evaluation  We discussed earlier return parameters  We discussed follow-up  He understands and agrees with this plan  Patient Progress  Patient progress: stable    CritCare Time    Disposition  Final diagnoses:   Right ankle pain   Gout     Time reflects when diagnosis was documented in both MDM as applicable and the Disposition within this note     Time User Action Codes Description Comment    7/8/2018  8:40 PM Zena Romo Right ankle pain     7/8/2018  8:40 PM Simin Shaw Add [M10 9] Gout       ED Disposition     ED Disposition Condition Comment    Discharge  Leighton Perks discharge to home/self care      Condition at discharge: Stable        Follow-up Information     Follow up With Specialties Details Why [facility], DO Internal Medicine Call in 1 day for follow up appointment [address] [facility] [address] [place] [id number]  [phone]            Discharge Medication List as of 7/8/2018  8:43 PM      START taking these medications    Details   indomethacin (INDOCIN) 25 mg capsule Take 2 capsules (50 mg total) by mouth 3 (three) times a day for 5 days, Starting Sun 7/8/2018, Until Fri 7/13/2018, Print      oxyCODONE-acetaminophen (PERCOCET) 5-325 mg per tablet Take 1 tablet by mouth every 8 (eight) hours as needed for moderate pain Max Daily Amount: 3 tablets, Starting Sun 7/8/2018, Print      predniSONE 20 mg tablet Take 1 tablet (20 mg total) by mouth daily for 5 days Take 3 tabs daily for 5 days  , Starting Sun 7/8/2018, Until Fri 7/13/2018, Print         CONTINUE these medications which have NOT CHANGED    Details   albuterol (ACCUNEB) 1 25 MG/3ML nebulizer solution Take 3 mL by nebulization every 6 (six) hours as needed for wheezing, Starting Fri 1/26/2018, Normal      albuterol (VENTOLIN HFA) 90 mcg/act inhaler Inhale 2 puffs every 4 (four) hours as needed for wheezing, Starting Fri 1/26/2018, Normal      allopurinol (ZYLOPRIM) 300 mg tablet Take 300 mg by mouth daily, Until Discontinued, Historical Med      diazepam (VALIUM) 5 mg tablet Take 1 tablet (5 mg total) by mouth 2 (two) times a day for 8 doses, Starting Sun 6/3/2018, Until Thu 6/7/2018, Print      diclofenac sodium (VOLTAREN) 1 % Apply 2 g topically 4 (four) times a day for 30 days, Starting Sun 6/3/2018, Until Tue 7/3/2018, Print      fluticasone furoate-vilanterol (BREO ELLIPTA) Inhale 1 puff daily, Starting Fri 2/16/2018, Print      ibuprofen (MOTRIN) 800 mg tablet Take 1 tablet by mouth 2 (two) times a day for 10 days, Starting Sat 11/18/2017, Until Tue 11/28/2017, Print      losartan (COZAAR) 100 MG tablet Take 100 mg by mouth daily, Until Discontinued, Historical Med      methocarbamol (ROBAXIN) 750 mg tablet Take 750 mg by mouth 4 (four) times a day, Until Discontinued, Historical Med      zolpidem (AMBIEN) 10 mg tablet Take 1 tablet (10 mg total) by mouth daily at bedtime as needed for sleep, Starting Tue 4/3/2018, Phone In           No discharge procedures on file      ED Provider  Electronically Signed by           Solo Llamas PA-C  07/09/18 9917

## 2018-07-09 NOTE — DISCHARGE INSTRUCTIONS
Return to the Emergency Department sooner if increased pain, swelling, numbness, weakness, vomiting, difficulty breathing, redness  Ice, elevate  If insurance does not cover indomethacin, or you cannot afford it, you can alternatively take prednisone  Otherwise you do not take prednisone if you are taking indomethacin  Gout   WHAT YOU NEED TO KNOW:   What is gout? Gout is a form of arthritis that causes severe joint pain and stiffness  Acute gout pain starts suddenly, gets worse quickly, and stops on its own  Acute gout can become chronic and cause permanent damage to your joints  What causes gout? Gout develops when uric acid builds up in your joints  Uric acid is made when your body breaks down purines  Purines are found in some medicines and foods  Your body gets rid of most uric acid through your urine  When your body cannot get rid of enough uric acid, it can build up and form crystals in your joints  The crystals cause your joints to become swollen and painful  This is called a gout attack  What increases my risk for gout? You may have been born with a decreased ability to break down and get rid of purines  Your body's ability to break down purines may be very slow  Gout is more common in men than in women  Any of the following can also increase your risk:  · A family history of gout    · Kidney disease or problems with how your kidneys work     · Eating foods that are high in purines, such as red meat    · Alcohol or tobacco use    · Diuretic medicine (water pills), or aspirin    · A medical condition such as diabetes, high blood pressure, or high cholesterol    · A condition such as an irregular heartbeat or a blood clot in your lungs  What are the stages of gout? · Hyperuricemia  is a high level of uric acid  Hyperuricemia is not gout, but it increases your risk for gout  You may have no symptoms at this stage, and it usually does not need treatment       · Acute gouty arthritis  starts with a sudden attack of pain and swelling, usually in 1 joint  This may be in your big toe  The attack may last from a few days to 2 weeks  · Intercritical gout  is the time between attacks  You may go months or years without another attack  You will not have joint pain or stiffness, but this does not mean your gout is cured  You will still need treatment to prevent chronic gout  · Chronic tophaceous gout  develops if gout is not treated  Large amounts of uric acid crystals, called tophi, collect around your joints  The crystals can destroy or deform the joints  Gout attacks occur more often, and last hours to weeks  More than 1 joint may be painful and swollen  At this stage, gout symptoms do not go away on their own  How is gout diagnosed? Your healthcare provider will ask about your medicines, health problems, and allergies  Tell him or her when your joint pain and swelling started  He or she will need to know if the swelling and pain were worst within 1 day or if got worse over time  He or she will check the skin over your joints for redness  You may also need any of the following:  · Blood tests  are used to check the level of uric acid  You may need to have blood tested more than once  · A synovial fluid test  is used to collect a sample of fluid from around your painful joint  The fluid is sent to a lab to check for uric acid crystals  Synovial fluid surrounds and protects your joints  · An x-ray, ultrasound, CT, or MRI  may show the gout or damage to bones caused by gout  You may be given contrast liquid to help your joints show up better in the pictures  Tell the healthcare provider if you have ever had an allergic reaction to contrast liquid  Do not enter the MRI room with anything metal  Metal can cause serious injury  Tell the healthcare provider if you have any metal in or on your body  How is gout treated?   The following can make your symptoms stop sooner, prevent attacks, and decrease your risk for joint damage:  · Medicines:      ¨ NSAIDs , such as ibuprofen, help decrease swelling, pain, and fever  This medicine is available with or without a doctor's order  NSAIDs can cause stomach bleeding or kidney problems in certain people  If you take blood thinner medicine, always ask your healthcare provider if NSAIDs are safe for you  Always read the medicine label and follow directions  ¨ Gout medicine  decreases joint pain and swelling  It may also be given to prevent new gout attacks  ¨ Steroids  reduce inflammation and can help your joint stiffness and pain during gout attacks  ¨ Uric acid medicine  may be given to reduce the amount of uric acid your body makes  Some medicines may help you pass more uric acid when you urinate  · Surgery  called a bone graft may be needed for tophi that are painful or infected  Bone in the joint may be replaced with bone taken from another place in your body  Ask your healthcare provider for more information about bone graft surgery  How can I manage gout? · Rest your painful joint so it can heal   Your healthcare provider may recommend crutches or a walker if the affected joint is in a leg  · Apply ice to your joint  Ice decreases pain and swelling  Use an ice pack, or put crushed ice in a plastic bag  Cover the ice pack or bag with a towel before you apply it to your painful joint  Apply ice for 15 to 20 minutes every hour, or as directed  · Elevate your joint  Elevation helps reduce swelling and pain  Raise your joint above the level of your heart as often as you can  Prop your painful joint on pillows to keep it above your heart comfortably  · Go to physical therapy if directed  A physical therapist can teach you exercises to improve flexibility and range of motion  How can I prevent gout attacks? · Do not eat high-purine foods  These foods include meats, seafood, asparagus, spinach, cauliflower, and some types of beans   Healthcare providers may tell you to eat more low-fat milk products, such as yogurt  Milk products may decrease your risk for gout attacks  Vitamin C and coffee may also help  Your healthcare provider or dietitian can help you create a meal plan  · Drink more liquids as directed  Liquids such as water help remove uric acid from your body  Ask how much liquid to drink each day and which liquids are best for you  · Manage your weight  Weight loss may decrease the amount of uric acid in your body  Exercise can help you lose weight  Talk to your healthcare provider about the best exercises for you  · Control your blood sugar level if you have diabetes  Keep your blood sugar level in a normal range  This can help prevent gout attacks  · Limit or do not drink alcohol as directed  Alcohol can trigger a gout attack  Alcohol also increases your risk for dehydration  Ask your healthcare provider if alcohol is safe for you  When should I seek immediate care? · You have severe joint pain that you cannot tolerate  · You have a fever or redness that spreads beyond the joint area  When should I contact my healthcare provider? · You have a fever, chills, or body aches  · You are confused or more tired than usual      · You have new symptoms, such as a rash, after you start gout treatment  · Your joint pain and swelling do not go away, even after treatment  · You are not urinating as much or as often as you usually do  · You have trouble taking your gout medicines  CARE AGREEMENT:   You have the right to help plan your care  Learn about your health condition and how it may be treated  Discuss treatment options with your caregivers to decide what care you want to receive  You always have the right to refuse treatment  The above information is an  only  It is not intended as medical advice for individual conditions or treatments   Talk to your doctor, nurse or pharmacist before following any medical regimen to see if it is safe and effective for you  © 2017 [employer] Information is for End User's use only and may not be sold, redistributed or otherwise used for commercial purposes  All illustrations and images included in CareNotes® are the copyrighted property of [employer]  or Andres Willis

## 2018-07-16 ENCOUNTER — OFFICE VISIT (OUTPATIENT)
Dept: INTERNAL MEDICINE CLINIC | Facility: CLINIC | Age: 48
End: 2018-07-16
Payer: COMMERCIAL

## 2018-07-16 VITALS
OXYGEN SATURATION: 95 % | RESPIRATION RATE: 20 BRPM | HEIGHT: 66 IN | BODY MASS INDEX: 36 KG/M2 | WEIGHT: 224 LBS | HEART RATE: 70 BPM | DIASTOLIC BLOOD PRESSURE: 100 MMHG | TEMPERATURE: 98.5 F | SYSTOLIC BLOOD PRESSURE: 148 MMHG

## 2018-07-16 DIAGNOSIS — R06.02 SOB (SHORTNESS OF BREATH): ICD-10-CM

## 2018-07-16 DIAGNOSIS — R06.02 SHORTNESS OF BREATH: ICD-10-CM

## 2018-07-16 DIAGNOSIS — J44.9 CHRONIC OBSTRUCTIVE PULMONARY DISEASE, UNSPECIFIED COPD TYPE (HCC): ICD-10-CM

## 2018-07-16 DIAGNOSIS — Z72.0 TOBACCO ABUSE: ICD-10-CM

## 2018-07-16 DIAGNOSIS — I10 BENIGN ESSENTIAL HYPERTENSION: ICD-10-CM

## 2018-07-16 DIAGNOSIS — G89.29 CHRONIC LOW BACK PAIN, UNSPECIFIED BACK PAIN LATERALITY, WITH SCIATICA PRESENCE UNSPECIFIED: ICD-10-CM

## 2018-07-16 DIAGNOSIS — R06.2 WHEEZING: ICD-10-CM

## 2018-07-16 DIAGNOSIS — M54.5 CHRONIC LOW BACK PAIN, UNSPECIFIED BACK PAIN LATERALITY, WITH SCIATICA PRESENCE UNSPECIFIED: ICD-10-CM

## 2018-07-16 DIAGNOSIS — E78.1 HYPERTRIGLYCERIDEMIA: ICD-10-CM

## 2018-07-16 DIAGNOSIS — M10.9 GOUT INVOLVING TOE OF LEFT FOOT, UNSPECIFIED CAUSE, UNSPECIFIED CHRONICITY: ICD-10-CM

## 2018-07-16 DIAGNOSIS — E03.9 HYPOTHYROIDISM, UNSPECIFIED TYPE: Primary | ICD-10-CM

## 2018-07-16 DIAGNOSIS — E78.2 MIXED HYPERLIPIDEMIA: ICD-10-CM

## 2018-07-16 DIAGNOSIS — J41.0 SIMPLE CHRONIC BRONCHITIS (HCC): ICD-10-CM

## 2018-07-16 PROBLEM — R00.0 TACHYCARDIA: Status: ACTIVE | Noted: 2018-07-16

## 2018-07-16 PROBLEM — F17.200 NICOTINE DEPENDENCE: Status: ACTIVE | Noted: 2018-07-16

## 2018-07-16 PROBLEM — I77.9 PERIPHERAL ARTERIAL OCCLUSIVE DISEASE (HCC): Status: ACTIVE | Noted: 2018-07-16

## 2018-07-16 PROBLEM — M54.9 CHRONIC BACK PAIN: Status: ACTIVE | Noted: 2018-07-16

## 2018-07-16 PROBLEM — I45.9 HEART BLOCK: Status: ACTIVE | Noted: 2018-07-16

## 2018-07-16 PROBLEM — M54.50 CHRONIC LOW BACK PAIN: Status: ACTIVE | Noted: 2018-07-16

## 2018-07-16 PROBLEM — M54.16 LUMBAR RADICULOPATHY: Status: ACTIVE | Noted: 2018-07-16

## 2018-07-16 PROBLEM — E66.9 SIMPLE OBESITY: Status: ACTIVE | Noted: 2018-07-16

## 2018-07-16 PROBLEM — B02.8 EXANTHEM DUE TO HERPES ZOSTER: Status: ACTIVE | Noted: 2018-07-16

## 2018-07-16 PROBLEM — D72.829 LEUKOCYTOSIS: Status: ACTIVE | Noted: 2018-07-16

## 2018-07-16 PROBLEM — D75.839 THROMBOCYTOSIS: Status: ACTIVE | Noted: 2018-07-16

## 2018-07-16 PROBLEM — K57.30 DIVERTICULOSIS OF LARGE INTESTINE WITHOUT HEMORRHAGE: Status: ACTIVE | Noted: 2018-07-16

## 2018-07-16 PROCEDURE — 99204 OFFICE O/P NEW MOD 45 MIN: CPT | Performed by: NURSE PRACTITIONER

## 2018-07-16 PROCEDURE — 3008F BODY MASS INDEX DOCD: CPT | Performed by: NURSE PRACTITIONER

## 2018-07-16 RX ORDER — RANITIDINE 150 MG/1
150 TABLET ORAL 2 TIMES DAILY
Qty: 180 TABLET | Refills: 2 | Status: SHIPPED | OUTPATIENT
Start: 2018-07-16 | End: 2018-09-07

## 2018-07-16 RX ORDER — INDOMETHACIN 50 MG/1
50 CAPSULE ORAL
Qty: 30 CAPSULE | Refills: 0 | Status: SHIPPED | OUTPATIENT
Start: 2018-07-16 | End: 2018-09-18 | Stop reason: HOSPADM

## 2018-07-16 RX ORDER — VALACYCLOVIR HYDROCHLORIDE 1 G/1
TABLET, FILM COATED ORAL
COMMUNITY
End: 2018-07-16 | Stop reason: ALTCHOICE

## 2018-07-16 RX ORDER — ALBUTEROL SULFATE 90 UG/1
2 AEROSOL, METERED RESPIRATORY (INHALATION) EVERY 4 HOURS PRN
Qty: 8 G | Refills: 3 | Status: SHIPPED | OUTPATIENT
Start: 2018-07-16 | End: 2018-09-07

## 2018-07-16 RX ORDER — FEBUXOSTAT 80 MG/1
TABLET, FILM COATED ORAL DAILY
COMMUNITY
End: 2018-07-16 | Stop reason: SDUPTHER

## 2018-07-16 RX ORDER — RANITIDINE 150 MG/1
TABLET ORAL
COMMUNITY
End: 2018-07-16 | Stop reason: SDUPTHER

## 2018-07-16 RX ORDER — CLINDAMYCIN HYDROCHLORIDE 150 MG/1
CAPSULE ORAL
COMMUNITY
End: 2018-07-16 | Stop reason: ALTCHOICE

## 2018-07-16 RX ORDER — LOSARTAN POTASSIUM 100 MG/1
100 TABLET ORAL DAILY
Qty: 90 TABLET | Refills: 3 | Status: SHIPPED | OUTPATIENT
Start: 2018-07-16

## 2018-07-16 RX ORDER — ALLOPURINOL 300 MG/1
300 TABLET ORAL DAILY
Qty: 90 TABLET | Refills: 3 | Status: SHIPPED | OUTPATIENT
Start: 2018-07-16

## 2018-07-16 RX ORDER — HYDROCHLOROTHIAZIDE 25 MG/1
25 TABLET ORAL DAILY
Qty: 90 TABLET | Refills: 3 | Status: SHIPPED | OUTPATIENT
Start: 2018-07-16 | End: 2018-09-07

## 2018-07-16 RX ORDER — MELOXICAM 15 MG/1
TABLET ORAL DAILY
COMMUNITY
End: 2018-07-16

## 2018-07-16 RX ORDER — DIAZEPAM 5 MG/1
TABLET ORAL
COMMUNITY
End: 2018-07-16 | Stop reason: ALTCHOICE

## 2018-07-16 RX ORDER — METHOCARBAMOL 750 MG/1
TABLET, FILM COATED ORAL
COMMUNITY
End: 2018-07-16

## 2018-07-16 RX ORDER — LOSARTAN POTASSIUM 100 MG/1
TABLET ORAL
COMMUNITY
End: 2018-07-16

## 2018-07-16 RX ORDER — ALBUTEROL SULFATE 2.5 MG/3ML
1 SOLUTION RESPIRATORY (INHALATION) EVERY 6 HOURS PRN
COMMUNITY
End: 2018-07-16 | Stop reason: SDUPTHER

## 2018-07-16 RX ORDER — HYDROCHLOROTHIAZIDE 12.5 MG/1
TABLET ORAL
COMMUNITY
End: 2018-07-16 | Stop reason: DRUGHIGH

## 2018-07-16 RX ORDER — ALBUTEROL SULFATE 2.5 MG/3ML
2.5 SOLUTION RESPIRATORY (INHALATION) EVERY 6 HOURS PRN
Qty: 10 VIAL | Refills: 3 | Status: SHIPPED | OUTPATIENT
Start: 2018-07-16 | End: 2018-09-07

## 2018-07-16 RX ORDER — FLUTICASONE FUROATE AND VILANTEROL 100; 25 UG/1; UG/1
1 POWDER RESPIRATORY (INHALATION) DAILY
Qty: 1 EACH | Refills: 5 | Status: SHIPPED | OUTPATIENT
Start: 2018-07-16 | End: 2018-09-07

## 2018-07-16 RX ORDER — INDOMETHACIN 50 MG/1
CAPSULE ORAL
COMMUNITY
End: 2018-07-16 | Stop reason: SDUPTHER

## 2018-07-16 RX ORDER — ZOLPIDEM TARTRATE 10 MG/1
TABLET ORAL
COMMUNITY
End: 2018-07-16

## 2018-07-16 RX ORDER — FEBUXOSTAT 80 MG/1
80 TABLET, FILM COATED ORAL DAILY
Qty: 90 TABLET | Refills: 1 | Status: SHIPPED | OUTPATIENT
Start: 2018-07-16 | End: 2018-09-07

## 2018-07-16 NOTE — PROGRESS NOTES
Assessment/Plan:    1  Gout-Please fill your Prednisone script  You can also take Indomethacin for pain  We discussed uric acid testing 6-8 weeks after gout flare has resolved  2  Chronic Back Pain-Will be seeing Dr Francis Rodriguez in a few weeks  3  COPD-will consider PFTs, continue Breo Ellipta for now  4  Hypertension not at goal, will increase HCTZ to 25 mg daily in addition to Losartan 100 mg daily  5  Hypothyroidism-not on medication, will check TSH  6  Insomnia-has been taking Ambien, will consider alternatives in the future  Follow up with me as needed and with Dr Rickey Jordan in 1-2 months  Call with any concerns  Diagnoses and all orders for this visit:    Hypothyroidism, unspecified type  -     albuterol (2 5 mg/3 mL) 0 083 % nebulizer solution; Take 1 vial (2 5 mg total) by nebulization every 6 (six) hours as needed for shortness of breath  -     losartan (COZAAR) 100 MG tablet; Take 1 tablet (100 mg total) by mouth daily  -     hydrochlorothiazide (HYDRODIURIL) 25 mg tablet; Take 1 tablet (25 mg total) by mouth daily  -     allopurinol (ZYLOPRIM) 300 mg tablet; Take 1 tablet (300 mg total) by mouth daily  -     ranitidine (ZANTAC) 150 mg tablet; Take 1 tablet (150 mg total) by mouth 2 (two) times a day  -     CBC and differential; Future  -     Comprehensive metabolic panel; Future  -     TSH, 3rd generation with T4 reflex; Future  -     Lipid panel; Future  -     Ambulatory referral to Dermatology; Future  -     UA w Reflex to Microscopic w Reflex to Culture -Lab Collect    Chronic obstructive pulmonary disease, unspecified COPD type (HCC)  -     albuterol (2 5 mg/3 mL) 0 083 % nebulizer solution; Take 1 vial (2 5 mg total) by nebulization every 6 (six) hours as needed for shortness of breath  -     losartan (COZAAR) 100 MG tablet; Take 1 tablet (100 mg total) by mouth daily  -     hydrochlorothiazide (HYDRODIURIL) 25 mg tablet;  Take 1 tablet (25 mg total) by mouth daily  -     allopurinol (ZYLOPRIM) 300 mg tablet; Take 1 tablet (300 mg total) by mouth daily  -     ranitidine (ZANTAC) 150 mg tablet; Take 1 tablet (150 mg total) by mouth 2 (two) times a day  -     CBC and differential; Future  -     Comprehensive metabolic panel; Future  -     TSH, 3rd generation with T4 reflex; Future  -     Lipid panel; Future  -     Ambulatory referral to Dermatology; Future  -     UA w Reflex to Microscopic w Reflex to Culture -Lab Collect    Benign essential hypertension  -     albuterol (2 5 mg/3 mL) 0 083 % nebulizer solution; Take 1 vial (2 5 mg total) by nebulization every 6 (six) hours as needed for shortness of breath  -     losartan (COZAAR) 100 MG tablet; Take 1 tablet (100 mg total) by mouth daily  -     hydrochlorothiazide (HYDRODIURIL) 25 mg tablet; Take 1 tablet (25 mg total) by mouth daily  -     allopurinol (ZYLOPRIM) 300 mg tablet; Take 1 tablet (300 mg total) by mouth daily  -     ranitidine (ZANTAC) 150 mg tablet; Take 1 tablet (150 mg total) by mouth 2 (two) times a day  -     CBC and differential; Future  -     Comprehensive metabolic panel; Future  -     TSH, 3rd generation with T4 reflex; Future  -     Lipid panel; Future  -     Ambulatory referral to Dermatology; Future  -     UA w Reflex to Microscopic w Reflex to Culture -Lab Collect    Chronic low back pain, unspecified back pain laterality, with sciatica presence unspecified  -     albuterol (2 5 mg/3 mL) 0 083 % nebulizer solution; Take 1 vial (2 5 mg total) by nebulization every 6 (six) hours as needed for shortness of breath  -     losartan (COZAAR) 100 MG tablet; Take 1 tablet (100 mg total) by mouth daily  -     hydrochlorothiazide (HYDRODIURIL) 25 mg tablet; Take 1 tablet (25 mg total) by mouth daily  -     allopurinol (ZYLOPRIM) 300 mg tablet; Take 1 tablet (300 mg total) by mouth daily  -     ranitidine (ZANTAC) 150 mg tablet;  Take 1 tablet (150 mg total) by mouth 2 (two) times a day  -     CBC and differential; Future  -     Comprehensive metabolic panel; Future  -     TSH, 3rd generation with T4 reflex; Future  -     Lipid panel; Future  -     Ambulatory referral to Dermatology; Future  -     UA w Reflex to Microscopic w Reflex to Culture -Lab Collect    Mixed hyperlipidemia  -     albuterol (2 5 mg/3 mL) 0 083 % nebulizer solution; Take 1 vial (2 5 mg total) by nebulization every 6 (six) hours as needed for shortness of breath  -     losartan (COZAAR) 100 MG tablet; Take 1 tablet (100 mg total) by mouth daily  -     hydrochlorothiazide (HYDRODIURIL) 25 mg tablet; Take 1 tablet (25 mg total) by mouth daily  -     allopurinol (ZYLOPRIM) 300 mg tablet; Take 1 tablet (300 mg total) by mouth daily  -     ranitidine (ZANTAC) 150 mg tablet; Take 1 tablet (150 mg total) by mouth 2 (two) times a day  -     CBC and differential; Future  -     Comprehensive metabolic panel; Future  -     TSH, 3rd generation with T4 reflex; Future  -     Lipid panel; Future  -     Ambulatory referral to Dermatology; Future  -     UA w Reflex to Microscopic w Reflex to Culture -Lab Collect    Tobacco abuse  -     albuterol (2 5 mg/3 mL) 0 083 % nebulizer solution; Take 1 vial (2 5 mg total) by nebulization every 6 (six) hours as needed for shortness of breath  -     losartan (COZAAR) 100 MG tablet; Take 1 tablet (100 mg total) by mouth daily  -     hydrochlorothiazide (HYDRODIURIL) 25 mg tablet; Take 1 tablet (25 mg total) by mouth daily  -     allopurinol (ZYLOPRIM) 300 mg tablet; Take 1 tablet (300 mg total) by mouth daily  -     ranitidine (ZANTAC) 150 mg tablet; Take 1 tablet (150 mg total) by mouth 2 (two) times a day  -     CBC and differential; Future  -     Comprehensive metabolic panel; Future  -     TSH, 3rd generation with T4 reflex; Future  -     Lipid panel; Future  -     Ambulatory referral to Dermatology;  Future  -     UA w Reflex to Microscopic w Reflex to Culture -Lab Collect    SOB (shortness of breath)  -     albuterol (2 5 mg/3 mL) 0 083 % nebulizer solution; Take 1 vial (2 5 mg total) by nebulization every 6 (six) hours as needed for shortness of breath  -     losartan (COZAAR) 100 MG tablet; Take 1 tablet (100 mg total) by mouth daily  -     hydrochlorothiazide (HYDRODIURIL) 25 mg tablet; Take 1 tablet (25 mg total) by mouth daily  -     allopurinol (ZYLOPRIM) 300 mg tablet; Take 1 tablet (300 mg total) by mouth daily  -     ranitidine (ZANTAC) 150 mg tablet; Take 1 tablet (150 mg total) by mouth 2 (two) times a day  -     CBC and differential; Future  -     Comprehensive metabolic panel; Future  -     TSH, 3rd generation with T4 reflex; Future  -     Lipid panel; Future  -     Ambulatory referral to Dermatology; Future  -     UA w Reflex to Microscopic w Reflex to Culture -Lab Collect    Shortness of breath  -     albuterol (2 5 mg/3 mL) 0 083 % nebulizer solution; Take 1 vial (2 5 mg total) by nebulization every 6 (six) hours as needed for shortness of breath  -     albuterol (VENTOLIN HFA) 90 mcg/act inhaler; Inhale 2 puffs every 4 (four) hours as needed for wheezing  -     losartan (COZAAR) 100 MG tablet; Take 1 tablet (100 mg total) by mouth daily  -     hydrochlorothiazide (HYDRODIURIL) 25 mg tablet; Take 1 tablet (25 mg total) by mouth daily  -     allopurinol (ZYLOPRIM) 300 mg tablet; Take 1 tablet (300 mg total) by mouth daily  -     ranitidine (ZANTAC) 150 mg tablet; Take 1 tablet (150 mg total) by mouth 2 (two) times a day  -     CBC and differential; Future  -     Comprehensive metabolic panel; Future  -     TSH, 3rd generation with T4 reflex; Future  -     Lipid panel; Future  -     Ambulatory referral to Dermatology; Future  -     UA w Reflex to Microscopic w Reflex to Culture -Lab Collect    Wheezing  -     albuterol (2 5 mg/3 mL) 0 083 % nebulizer solution; Take 1 vial (2 5 mg total) by nebulization every 6 (six) hours as needed for shortness of breath  -     albuterol (VENTOLIN HFA) 90 mcg/act inhaler;  Inhale 2 puffs every 4 (four) hours as needed for wheezing  -     losartan (COZAAR) 100 MG tablet; Take 1 tablet (100 mg total) by mouth daily  -     hydrochlorothiazide (HYDRODIURIL) 25 mg tablet; Take 1 tablet (25 mg total) by mouth daily  -     allopurinol (ZYLOPRIM) 300 mg tablet; Take 1 tablet (300 mg total) by mouth daily  -     ranitidine (ZANTAC) 150 mg tablet; Take 1 tablet (150 mg total) by mouth 2 (two) times a day  -     CBC and differential; Future  -     Comprehensive metabolic panel; Future  -     TSH, 3rd generation with T4 reflex; Future  -     Lipid panel; Future  -     Ambulatory referral to Dermatology; Future  -     UA w Reflex to Microscopic w Reflex to Culture -Lab Collect    Simple chronic bronchitis (HCC)  -     albuterol (2 5 mg/3 mL) 0 083 % nebulizer solution; Take 1 vial (2 5 mg total) by nebulization every 6 (six) hours as needed for shortness of breath  -     fluticasone-vilanterol (BREO ELLIPTA) 100-25 mcg/inh inhaler; Inhale 1 puff daily  -     losartan (COZAAR) 100 MG tablet; Take 1 tablet (100 mg total) by mouth daily  -     hydrochlorothiazide (HYDRODIURIL) 25 mg tablet; Take 1 tablet (25 mg total) by mouth daily  -     allopurinol (ZYLOPRIM) 300 mg tablet; Take 1 tablet (300 mg total) by mouth daily  -     ranitidine (ZANTAC) 150 mg tablet; Take 1 tablet (150 mg total) by mouth 2 (two) times a day  -     CBC and differential; Future  -     Comprehensive metabolic panel; Future  -     TSH, 3rd generation with T4 reflex; Future  -     Lipid panel; Future  -     Ambulatory referral to Dermatology; Future  -     UA w Reflex to Microscopic w Reflex to Culture -Lab Collect    Gout involving toe of left foot, unspecified cause, unspecified chronicity  -     febuxostat (ULORIC) 80 MG TABS; Take 1 tablet (80 mg total) by mouth daily  -     indomethacin (INDOCIN) 50 mg capsule;  Take 1 capsule (50 mg total) by mouth 3 (three) times a day with meals    Hypertriglyceridemia    Other orders  -     Discontinue: clindamycin (CLEOCIN) 150 mg capsule; clindamycin HCl 150 mg capsule  -     Discontinue: hydrochlorothiazide (HYDRODIURIL) 12 5 mg tablet; hydrochlorothiazide 12 5 mg tablet  -     Discontinue: meloxicam (MOBIC) 15 mg tablet; Daily  -     Discontinue: ranitidine (ZANTAC) 150 mg tablet; ranitidine 150 mg tablet  -     Discontinue: tiotropium (SPIRIVA HANDIHALER) 18 mcg inhalation capsule; Daily  -     Discontinue: febuxostat (ULORIC) 80 MG TABS; Daily  -     Discontinue: valACYclovir (VALTREX) 1,000 mg tablet; valacyclovir 1 gram tablet  -     Discontinue: albuterol (2 5 mg/3 mL) 0 083 % nebulizer solution; Inhale 1 each every 6 (six) hours as needed  -     Discontinue: diazepam (VALIUM) 5 mg tablet; diazepam 5 mg tablet  -     Discontinue: losartan (COZAAR) 100 MG tablet; losartan 100 mg tablet  -     Discontinue: methocarbamol (ROBAXIN) 750 mg tablet; methocarbamol 750 mg tablet  -     Discontinue: zolpidem (AMBIEN) 10 mg tablet; zolpidem 10 mg tablet  -     Discontinue: indomethacin (INDOCIN) 50 mg capsule; indomethacin 50 mg capsule        The patient was counseled regarding instructions for management, risk factor reductions, patient and family education,impressions, risks and benefits of treatment options, side effects of medications, importance of compliance with treatment  The treatment plan was reviewed with the patient/guardian and patient/guardian understands and agrees with the treatment plan              Current Outpatient Prescriptions:     albuterol (2 5 mg/3 mL) 0 083 % nebulizer solution, Take 1 vial (2 5 mg total) by nebulization every 6 (six) hours as needed for shortness of breath, Disp: 10 vial, Rfl: 3    albuterol (VENTOLIN HFA) 90 mcg/act inhaler, Inhale 2 puffs every 4 (four) hours as needed for wheezing, Disp: 8 g, Rfl: 3    allopurinol (ZYLOPRIM) 300 mg tablet, Take 1 tablet (300 mg total) by mouth daily, Disp: 90 tablet, Rfl: 3    febuxostat (ULORIC) 80 MG TABS, Take 1 tablet (80 mg total) by mouth daily, Disp: 90 tablet, Rfl: 1    fluticasone-vilanterol (BREO ELLIPTA) 100-25 mcg/inh inhaler, Inhale 1 puff daily, Disp: 1 each, Rfl: 5    hydrochlorothiazide (HYDRODIURIL) 25 mg tablet, Take 1 tablet (25 mg total) by mouth daily, Disp: 90 tablet, Rfl: 3    indomethacin (INDOCIN) 50 mg capsule, Take 1 capsule (50 mg total) by mouth 3 (three) times a day with meals, Disp: 30 capsule, Rfl: 0    losartan (COZAAR) 100 MG tablet, Take 1 tablet (100 mg total) by mouth daily, Disp: 90 tablet, Rfl: 3    methocarbamol (ROBAXIN) 750 mg tablet, Take 750 mg by mouth 4 (four) times a day, Disp: , Rfl:     oxyCODONE-acetaminophen (PERCOCET) 5-325 mg per tablet, Take 1 tablet by mouth every 8 (eight) hours as needed for moderate pain Max Daily Amount: 3 tablets, Disp: 10 tablet, Rfl: 0    ranitidine (ZANTAC) 150 mg tablet, Take 1 tablet (150 mg total) by mouth 2 (two) times a day, Disp: 180 tablet, Rfl: 2    Subjective:      Patient ID: Meliton Eisenberg is a 57 y o  male  Patient with multiple ER presentations over the past year for back pain and gout, the last of which was about one week ago for gout in the right ankle, which has now moved to the left great toe  He was prescribed Prednisone but did not take the medication  He has been taking Percocet for gout and back pain which is what he is requesting today  [facility] website was accessed and shows he has been taking Vicodin per his last pcp  He has an appointment with pain management in three weeks  He has a history of COPD and is a current smoker  He notes a chronic cough with wheezing at times  He uses his nebulizer on occasion  He states that he has tried many different inhalers and had side effects with most of them  The following portions of the patient's history were reviewed and updated as appropriate:   He has a past medical history of Gout and Hypertension  ,   does not have any pertinent problems on file  ,   has a past surgical history that includes Joint replacement; Back surgery; Appendectomy; and Replacement total knee (Right)  ,  family history includes Arthritis in his mother; Asthma in his mother; Breast cancer in his sister; COPD in his mother; Coronary artery disease in his mother; Diabetes in his mother; Hyperlipidemia in his sister; Hypertension in his mother; Thrombosis in his mother; Thyroid disease in his sister  ,   reports that he has been smoking Cigarettes  He has been smoking about 2 00 packs per day  He has never used smokeless tobacco  He reports that he drinks alcohol  He reports that he does not use drugs  ,  has No Known Allergies       Review of Systems   Constitutional: Negative for chills and fever  HENT: Negative for ear pain, rhinorrhea, sinus pain and sore throat  Eyes: Negative for redness and visual disturbance  Respiratory: Positive for cough, shortness of breath and wheezing  Negative for chest tightness  Cardiovascular: Negative for chest pain and palpitations  Gastrointestinal: Negative for abdominal pain, constipation, diarrhea, nausea and vomiting  Endocrine: Negative  Genitourinary: Negative for dysuria, frequency and urgency  Musculoskeletal: Positive for arthralgias, back pain and joint swelling  Negative for myalgias  Skin: Positive for rash  Neurological: Negative for dizziness, numbness and headaches  Psychiatric/Behavioral: Negative            Objective:  /100 (BP Location: Left arm, Patient Position: Sitting, Cuff Size: Large)   Pulse 70   Temp 98 5 °F (36 9 °C) (Tympanic)   Resp 20   Ht 5' 6" (1 676 m)   Wt 102 kg (224 lb)   SpO2 95%   BMI 36 15 kg/m²     Lab Review  Admission on 06/03/2018, Discharged on 06/03/2018   Component Date Value    Sodium 06/03/2018 140     Potassium 06/03/2018 4 2     Chloride 06/03/2018 102     CO2 06/03/2018 30     Anion Gap 06/03/2018 8     BUN 06/03/2018 8     Creatinine 06/03/2018 1 07     Glucose 06/03/2018 140     Calcium 06/03/2018 9 5  AST 06/03/2018 22     ALT 06/03/2018 35     Alkaline Phosphatase 06/03/2018 79     Total Protein 06/03/2018 7 6     Albumin 06/03/2018 3 8     Total Bilirubin 06/03/2018 0 40     eGFR 06/03/2018 83     WBC 06/03/2018 10 67*    RBC 06/03/2018 5 01     Hemoglobin 06/03/2018 16 0     Hematocrit 06/03/2018 48 1     MCV 06/03/2018 96     MCH 06/03/2018 31 9     MCHC 06/03/2018 33 3     RDW 06/03/2018 13 9     MPV 06/03/2018 10 6     Platelets 26/07/4972 327     nRBC 06/03/2018 0     Neutrophils Relative 06/03/2018 64     Immat GRANS % 06/03/2018 1     Lymphocytes Relative 06/03/2018 25     Monocytes Relative 06/03/2018 8     Eosinophils Relative 06/03/2018 2     Basophils Relative 06/03/2018 0     Neutrophils Absolute 06/03/2018 6 83     Immature Grans Absolute 06/03/2018 0 07     Lymphocytes Absolute 06/03/2018 2 69     Monocytes Absolute 06/03/2018 0 84     Eosinophils Absolute 06/03/2018 0 20     Basophils Absolute 06/03/2018 0 04     Color, UA 06/03/2018 Yellow     Clarity, UA 06/03/2018 Clear     Specific Gravity, UA 06/03/2018 1 015     pH, UA 06/03/2018 8 0     Leukocytes, UA 06/03/2018 Negative     Nitrite, UA 06/03/2018 Negative     Protein, UA 06/03/2018 Negative     Glucose, UA 06/03/2018 Negative     Ketones, UA 06/03/2018 Negative     Urobilinogen, UA 06/03/2018 0 2     Bilirubin, UA 06/03/2018 Negative     Blood, UA 06/03/2018 Negative         Imaging: Xr Ankle 3+ Vw Right    Result Date: 7/9/2018  Narrative: RIGHT ANKLE INDICATION: Right ankle pain, ?injury  COMPARISON:  None VIEWS:  XR ANKLE 3+ VW RIGHT FINDINGS: There is no acute fracture or dislocation  Posterior calcaneal spurring noted  No joint space narrowing No lytic or blastic lesions seen  Generalized soft tissue swelling present     Impression: Generalized soft tissue swelling    No acute osseous abnormality of the right ankle Workstation performed: [id number]          Physical Exam   Constitutional: He is oriented to person, place, and time  He appears well-developed and well-nourished  HENT:   Head: Normocephalic and atraumatic  Right Ear: External ear normal    Left Ear: External ear normal    Nose: Nose normal    Mouth/Throat: Oropharynx is clear and moist    Eyes: Conjunctivae and lids are normal  Pupils are equal, round, and reactive to light  Neck: Normal range of motion  Neck supple  Carotid bruit is not present  No thyroid mass present  Cardiovascular: Normal rate, regular rhythm, normal heart sounds and intact distal pulses  Pulmonary/Chest: Effort normal and breath sounds normal    Abdominal: Soft  Bowel sounds are normal    Musculoskeletal: Normal range of motion  Neurological: He is alert and oriented to person, place, and time  He has normal strength and normal reflexes  Skin: Skin is warm, dry and intact  Psychiatric: He has a normal mood and affect   His speech is normal and behavior is normal  Cognition and memory are normal

## 2018-07-16 NOTE — PATIENT INSTRUCTIONS
1  Gout-Please fill your Prednisone script  You can also take Indomethacin for pain  We discussed uric acid testing 6-8 weeks after gout flare has resolved  2  Chronic Back Pain-Will be seeing Dr Loretta Caro in a few weeks  3  COPD-will consider PFTs, continue Breo Ellipta for now  4  Hypertension not at goal, will increase HCTZ to 25 mg daily in addition to Losartan 100 mg daily  5  Hypothyroidism-not on medication, will check TSH  6  Insomnia-has been taking Ambien, will consider alternatives in the future  Follow up with me as needed and with Dr Ilan Hopkins in 1-2 months  Call with any concerns

## 2018-08-19 ENCOUNTER — HOSPITAL ENCOUNTER (EMERGENCY)
Facility: HOSPITAL | Age: 48
Discharge: HOME | End: 2018-08-19
Attending: EMERGENCY MEDICINE
Payer: COMMERCIAL

## 2018-08-19 VITALS
BODY MASS INDEX: 35.51 KG/M2 | SYSTOLIC BLOOD PRESSURE: 178 MMHG | OXYGEN SATURATION: 99 % | TEMPERATURE: 97.7 F | DIASTOLIC BLOOD PRESSURE: 93 MMHG | RESPIRATION RATE: 17 BRPM | HEART RATE: 89 BPM | WEIGHT: 220 LBS
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
## 2018-08-19 PROCEDURE — 99281 EMR DPT VST MAYX REQ PHY/QHP: CPT

## 2018-08-19 RX ORDER — SULFAMETHOXAZOLE AND TRIMETHOPRIM 800; 160 MG/1; MG/1
1 TABLET ORAL ONCE
Status: COMPLETED | OUTPATIENT
Start: 2018-08-19 | End: 2018-08-19

## 2018-08-19 RX ORDER — LIDOCAINE HYDROCHLORIDE 20 MG/ML
5 INJECTION, SOLUTION EPIDURAL; INFILTRATION; INTRACAUDAL; PERINEURAL ONCE
Status: COMPLETED | OUTPATIENT
Start: 2018-08-19 | End: 2018-08-19

## 2018-08-19 RX ORDER — SULFAMETHOXAZOLE AND TRIMETHOPRIM 800; 160 MG/1; MG/1
1 TABLET ORAL 2 TIMES DAILY
Qty: 14 TABLET | Refills: 0 | Status: SHIPPED | OUTPATIENT
Start: 2018-08-19 | End: 2018-08-26

## 2018-08-19 RX ORDER — NAPROXEN 500 MG/1
500 TABLET ORAL 2 TIMES DAILY WITH MEALS
Qty: 20 TABLET | Refills: 0 | Status: SHIPPED | OUTPATIENT
Start: 2018-08-19 | End: 2018-09-18 | Stop reason: HOSPADM

## 2018-08-19 RX ORDER — CEPHALEXIN 500 MG/1
500 CAPSULE ORAL 4 TIMES DAILY
Qty: 28 CAPSULE | Refills: 0 | Status: SHIPPED | OUTPATIENT
Start: 2018-08-19 | End: 2018-08-26

## 2018-08-19 RX ORDER — CEPHALEXIN 250 MG/1
500 CAPSULE ORAL ONCE
Status: COMPLETED | OUTPATIENT
Start: 2018-08-19 | End: 2018-08-19

## 2018-08-19 RX ADMIN — SULFAMETHOXAZOLE AND TRIMETHOPRIM 1 TABLET: 800; 160 TABLET ORAL at 20:30

## 2018-08-19 RX ADMIN — LIDOCAINE HYDROCHLORIDE 5 ML: 20 INJECTION, SOLUTION EPIDURAL; INFILTRATION; INTRACAUDAL; PERINEURAL at 19:55

## 2018-08-19 RX ADMIN — CEPHALEXIN 500 MG: 250 CAPSULE ORAL at 20:30

## 2018-08-19 NOTE — ED PROVIDER NOTES
History  Chief Complaint   Patient presents with   Dwaine Goldberg 85     pt c/o what he believes to be a bite on his right buttocks  states that it is draining pus     This 31-year-old male presents for evaluation of a possible spider bite on his right buttock  Patient reports that 3 to 4 days ago he noticed pain and induration over the inferior medial aspect of his right buttock  He has been complaining of draining pus from the area  A few days ago the patient started taking clindamycin that his wife had from a dental procedure  He states that he has been taking 150 mg 4 times a day  Continues to have pain and drainage from the area  Denies fevers, nausea, vomiting  No radiation of symptoms  Pain is worse with sitting  Bowel movements have been normal  No nausea or vomiting  History provided by:  Significant other and patient   used: No        Prior to Admission Medications   Prescriptions Last Dose Informant Patient Reported? Taking?    albuterol (2 5 mg/3 mL) 0 083 % nebulizer solution   No No   Sig: Take 1 vial (2 5 mg total) by nebulization every 6 (six) hours as needed for shortness of breath   albuterol (VENTOLIN HFA) 90 mcg/act inhaler   No No   Sig: Inhale 2 puffs every 4 (four) hours as needed for wheezing   allopurinol (ZYLOPRIM) 300 mg tablet   No No   Sig: Take 1 tablet (300 mg total) by mouth daily   febuxostat (ULORIC) 80 MG TABS   No No   Sig: Take 1 tablet (80 mg total) by mouth daily   fluticasone-vilanterol (BREO ELLIPTA) 100-25 mcg/inh inhaler   No No   Sig: Inhale 1 puff daily   hydrochlorothiazide (HYDRODIURIL) 25 mg tablet   No No   Sig: Take 1 tablet (25 mg total) by mouth daily   indomethacin (INDOCIN) 50 mg capsule   No No   Sig: Take 1 capsule (50 mg total) by mouth 3 (three) times a day with meals   losartan (COZAAR) 100 MG tablet   No No   Sig: Take 1 tablet (100 mg total) by mouth daily   methocarbamol (ROBAXIN) 750 mg tablet   Yes No   Sig: Take 750 mg by mouth 4 (four) times a day   oxyCODONE-acetaminophen (PERCOCET) 5-325 mg per tablet   No No   Sig: Take 1 tablet by mouth every 8 (eight) hours as needed for moderate pain Max Daily Amount: 3 tablets   ranitidine (ZANTAC) 150 mg tablet   No No   Sig: Take 1 tablet (150 mg total) by mouth 2 (two) times a day      Facility-Administered Medications: None       Past Medical History:   Diagnosis Date    Gout     Hypertension        Past Surgical History:   Procedure Laterality Date    APPENDECTOMY      BACK SURGERY      JOINT REPLACEMENT      REPLACEMENT TOTAL KNEE Right        Family History   Problem Relation Age of Onset    Coronary artery disease Mother     Hypertension Mother     Diabetes Mother     Asthma Mother     COPD Mother     Thrombosis Mother    [place] Arthritis Mother     Hyperlipidemia Sister     Thyroid disease Sister     Breast cancer Sister      I have reviewed and agree with the history as documented  Social History   Substance Use Topics    Smoking status: Current Every Day Smoker     Packs/day: 1 00     Types: Cigarettes    Smokeless tobacco: Never Used    Alcohol use Yes      Comment: occasional        Review of Systems   Constitutional: Negative for activity change, appetite change, diaphoresis, fatigue and fever  HENT: Negative for congestion, rhinorrhea, sore throat and trouble swallowing  Eyes: Negative for pain and visual disturbance  Respiratory: Negative for apnea, cough, chest tightness and shortness of breath  Cardiovascular: Negative for chest pain  Gastrointestinal: Negative for abdominal pain, blood in stool, diarrhea, nausea and vomiting  Endocrine: Negative for polyphagia and polyuria  Genitourinary: Negative for dysuria, flank pain, frequency, hematuria and urgency  Musculoskeletal: Negative for back pain, gait problem, neck pain and neck stiffness  Skin: Negative for color change and rash     Allergic/Immunologic: Negative for immunocompromised state  Neurological: Negative for dizziness, speech difficulty, numbness and headaches  Hematological: Does not bruise/bleed easily  Psychiatric/Behavioral: Negative for confusion and suicidal ideas  Physical Exam  Physical Exam   Constitutional: He is oriented to person, place, and time  He appears well-developed and well-nourished  HENT:   Head: Normocephalic  Eyes: Conjunctivae and EOM are normal  Pupils are equal, round, and reactive to light  No scleral icterus  Neck: Normal range of motion  Neck supple  Cardiovascular: Normal rate and regular rhythm  Abdominal: Soft  Bowel sounds are normal  He exhibits no distension  There is no tenderness  There is no rebound and no guarding  Musculoskeletal: Normal range of motion  He exhibits no tenderness or deformity  Lymphadenopathy:     He has no cervical adenopathy  Neurological: He is alert and oriented to person, place, and time  Coordination normal    Skin: Skin is warm and dry  Capillary refill takes less than 2 seconds  He is not diaphoretic  Is a 3 cm area of induration with necrotic center lower medial aspect of the right buttock  Minimal drainage  Mild to moderately tender  Psychiatric: He has a normal mood and affect  Vitals reviewed        Vital Signs  ED Triage Vitals [08/19/18 1903]   Temperature Pulse Respirations Blood Pressure SpO2   97 7 °F (36 5 °C) 89 17 (!) 178/93 99 %      Temp Source Heart Rate Source Patient Position - Orthostatic VS BP Location FiO2 (%)   Oral Monitor Sitting Right arm --      Pain Score       9           Vitals:    08/19/18 1903   BP: (!) 178/93   Pulse: 89   Patient Position - Orthostatic VS: Sitting       Visual Acuity      ED Medications  Medications   lidocaine (PF) (XYLOCAINE-MPF) 2 % injection 5 mL (5 mL Infiltration Given by Other 8/19/18 1955)       Diagnostic Studies  Results Reviewed     None                 No orders to display              Procedures  Procedures Phone Contacts  ED Phone Contact    ED Course                               MDM  Number of Diagnoses or Management Options  Insect bite: new and requires workup  Diagnosis management comments: 53-year-old male presented for area of induration on his right lower buttock concerning for possible abscess  Patient thinks he may have been bitten by a spider or other insect  Area of induration has a necrotic center but is not fluctuant  Appears to be draining a scant amount of pus  Will place on antibiotics, recommend PCP follow-up  Patient currently with normal vital signs, nontoxic appearance, afebrile with no signs of systemic infection  Will be discharged with return precautions  Amount and/or Complexity of Data Reviewed  Review and summarize past medical records: yes      CritCare Time    Disposition  Final diagnoses:   Insect bite     Time reflects when diagnosis was documented in both MDM as applicable and the Disposition within this note     Time User Action Codes Description Comment    8/19/2018  7:51 PM Dominik Hernandez Taz  XXXA] Insect bite       ED Disposition     ED Disposition Condition Comment    Discharge  Shonda Hatfield discharge to home/self care  Condition at discharge: Good        Follow-up Information     Follow up With Specialties Details Why [facility], DO Internal Medicine  Please follow-up with your primary care physician in 2 to 3 days  If you have new or worsening symptoms please call your doctor or return to the emergency department   [address]             Patient's Medications   Discharge Prescriptions    CEPHALEXIN (KEFLEX) 500 MG CAPSULE    Take 1 capsule (500 mg total) by mouth 4 (four) times a day for 7 days       Start Date: 8/19/2018 End Date: 8/26/2018       Order Dose: 500 mg       Quantity: 28 capsule    Refills: 0    SULFAMETHOXAZOLE-TRIMETHOPRIM (BACTRIM DS) 800-160 MG PER TABLET    Take 1 tablet by mouth 2 (two) times a day for 7 days smx-tmp DS (BACTRIM) 800-160 mg tabs (1tab q12 D10)       Start Date: 8/19/2018 End Date: 8/26/2018       Order Dose: 1 tablet       Quantity: 14 tablet    Refills: 0     No discharge procedures on file      ED Provider  Electronically Signed by           Ayesha Qureshi MD  08/19/18 5870

## 2018-08-19 NOTE — DISCHARGE INSTRUCTIONS
Insect Bite or Sting   WHAT YOU NEED TO KNOW:   Most insect bites and stings are not dangerous and go away without treatment  Your symptoms may be mild, or you may develop anaphylaxis  Anaphylaxis is a sudden, life-threatening reaction that needs immediate treatment  Common examples of insects that bite or sting are bees, ticks, mosquitoes, spiders, and ants  Insect bites or stings can lead to diseases such as malaria, West Nile virus, Lyme disease, or [place] Spotted Fever  DISCHARGE INSTRUCTIONS:   Call 911 for signs or symptoms of anaphylaxis,  such as trouble breathing, swelling in your mouth or throat, or wheezing  You may also have itching, a rash, hives, or feel like you are going to faint  Return to the emergency department if:   · You are stung on your tongue or in your throat  · A white area forms around the bite  · You are sweating badly or have body pain  · You think you were bitten or stung by a poisonous insect  Contact your healthcare provider if:   · You have a fever  · The area becomes red, warm, tender, and swollen beyond the area of the bite or sting  · You have questions or concerns about your condition or care  Medicines:   · Antihistamines  decrease itching and rash  · Epinephrine  is used to treat severe allergic reactions such as anaphylaxis  · Take your medicine as directed  Contact your healthcare provider if you think your medicine is not helping or if you have side effects  Tell him of her if you are allergic to any medicine  Keep a list of the medicines, vitamins, and herbs you take  Include the amounts, and when and why you take them  Bring the list or the pill bottles to follow-up visits  Carry your medicine list with you in case of an emergency  Steps to take for signs or symptoms of anaphylaxis:   · Immediately  give 1 shot of epinephrine only into the outer thigh muscle  · Leave the shot in place  as directed   Your healthcare provider may recommend you leave it in place for up to 10 seconds before you remove it  This helps make sure all of the epinephrine is delivered  · Call 911 and go to the emergency department,  even if the shot improved symptoms  Do not drive yourself  Bring the used epinephrine shot with you  Safety precautions to take if you are at risk for anaphylaxis:   · Keep 2 shots of epinephrine with you at all times  You may need a second shot, because epinephrine only works for about 20 minutes and symptoms may return  Your healthcare provider can show you and family members how to give the shot  Check the expiration date every month and replace it before it expires  · Create an action plan  Your healthcare provider can help you create a written plan that explains the allergy and an emergency plan to treat a reaction  The plan explains when to give a second epinephrine shot if symptoms return or do not improve after the first  Give copies of the action plan and emergency instructions to family members, work and school staff, and  providers  Show them how to give a shot of epinephrine  · Carry medical alert identification  Wear medical alert jewelry or carry a card that says you have an insect allergy  Ask your healthcare provider where to get these items  If an insect bites or stings you:   · Remove the stinger  Scrape the stinger out with your fingernail, edge of a credit card, or a knife blade  Do not squeeze the wound  Gently wash the area with soap and water  · Remove the tick  Ticks must be removed as soon as possible so you do not get diseases passed through tick bites  Ask your healthcare provider for more information on tick bites and how to remove ticks  Care for a bite or sting wound:   · Elevate the affected area  Prop the wound above the level of your heart, if possible  Elevate the area for 10 to 20 minutes each hour or as directed by your healthcare provider  · Use compresses    Soak a clean washcloth in cold water, wring it out, and put it on the bite or sting  Use the compress for 10 to 20 minutes each hour or as directed by your healthcare provider  After 24 to 48 hours, change to warm compresses  · Apply a paste  Add water to baking soda to make a thick paste  Put the paste on the area for 5 minutes  Rinse gently to remove the paste  Prevent another insect bite or sting:   · Do not wear bright-colored or flower-print clothing when you plan to spend time outdoors  Do not use hairspray, perfumes, or aftershave  · Do not leave food out  · Empty any standing water and wash container with soap and water every 2 days  · Put screens on all open windows and doors  · Put insect repellent that contains DEET on skin that is showing when you go outside  Put insect repellent at the top of your boots, bottom of pant legs, and sleeve cuffs  Wear long sleeves, pants, and shoes  · Use citronella candles outdoors to help keep mosquitoes away  Put a tick and flea collar on pets  Follow up with your healthcare provider as directed:  Write down your questions so you remember to ask them during your visits  © 2017 [facility] Information is for End User's use only and may not be sold, redistributed or otherwise used for commercial purposes  All illustrations and images included in CareNotes® are the copyrighted property of [employer]  or Andres Willis  The above information is an  only  It is not intended as medical advice for individual conditions or treatments  Talk to your doctor, nurse or pharmacist before following any medical regimen to see if it is safe and effective for you

## 2018-09-07 ENCOUNTER — APPOINTMENT (EMERGENCY)
Dept: RADIOLOGY | Facility: HOSPITAL | Age: 48
End: 2018-09-07
Payer: COMMERCIAL

## 2018-09-07 ENCOUNTER — APPOINTMENT (EMERGENCY)
Dept: CT IMAGING | Facility: HOSPITAL | Age: 48
End: 2018-09-07
Payer: COMMERCIAL

## 2018-09-07 ENCOUNTER — HOSPITAL ENCOUNTER (EMERGENCY)
Facility: HOSPITAL | Age: 48
Discharge: HOME | End: 2018-09-07
Attending: EMERGENCY MEDICINE
Payer: COMMERCIAL

## 2018-09-07 VITALS
WEIGHT: 220 LBS | SYSTOLIC BLOOD PRESSURE: 150 MMHG | BODY MASS INDEX: 35.36 KG/M2 | HEART RATE: 74 BPM | RESPIRATION RATE: 18 BRPM | HEIGHT: 66 IN | OXYGEN SATURATION: 96 % | TEMPERATURE: 97.6 F | DIASTOLIC BLOOD PRESSURE: 82 MMHG

## 2018-09-07 DIAGNOSIS — R06.02 SHORTNESS OF BREATH: ICD-10-CM

## 2018-09-07 DIAGNOSIS — Z72.0 TOBACCO ABUSE: ICD-10-CM

## 2018-09-07 DIAGNOSIS — E03.9 HYPOTHYROIDISM, UNSPECIFIED TYPE: ICD-10-CM

## 2018-09-07 DIAGNOSIS — R06.2 WHEEZING: ICD-10-CM

## 2018-09-07 DIAGNOSIS — J41.0 SIMPLE CHRONIC BRONCHITIS (HCC): ICD-10-CM

## 2018-09-07 DIAGNOSIS — J44.9 CHRONIC OBSTRUCTIVE PULMONARY DISEASE, UNSPECIFIED COPD TYPE (HCC): ICD-10-CM

## 2018-09-07 DIAGNOSIS — I10 BENIGN ESSENTIAL HYPERTENSION: ICD-10-CM

## 2018-09-07 DIAGNOSIS — J18.9 PNEUMONIA: ICD-10-CM

## 2018-09-07 DIAGNOSIS — M54.5 CHRONIC LOW BACK PAIN, UNSPECIFIED BACK PAIN LATERALITY, WITH SCIATICA PRESENCE UNSPECIFIED: ICD-10-CM

## 2018-09-07 DIAGNOSIS — J44.9 COPD (CHRONIC OBSTRUCTIVE PULMONARY DISEASE) (HCC): Primary | ICD-10-CM

## 2018-09-07 DIAGNOSIS — E78.2 MIXED HYPERLIPIDEMIA: ICD-10-CM

## 2018-09-07 DIAGNOSIS — G89.29 CHRONIC LOW BACK PAIN, UNSPECIFIED BACK PAIN LATERALITY, WITH SCIATICA PRESENCE UNSPECIFIED: ICD-10-CM

## 2018-09-07 DIAGNOSIS — R06.02 SOB (SHORTNESS OF BREATH): ICD-10-CM

## 2018-09-07 LAB
ALBUMIN SERPL BCP-MCNC: 3.8 G/DL (ref 3.5–5)
ALP SERPL-CCNC: 81 U/L (ref 46–116)
ALT SERPL W P-5'-P-CCNC: 45 U/L (ref 12–78)
ANION GAP SERPL CALCULATED.3IONS-SCNC: 6 MMOL/L (ref 4–13)
AST SERPL W P-5'-P-CCNC: 34 U/L (ref 5–45)
BASOPHILS # BLD AUTO: 0.05 THOUSANDS/ΜL (ref 0–0.1)
BASOPHILS NFR BLD AUTO: 0 % (ref 0–1)
BILIRUB SERPL-MCNC: 0.5 MG/DL (ref 0.2–1)
BUN SERPL-MCNC: 13 MG/DL (ref 5–25)
CALCIUM SERPL-MCNC: 9.3 MG/DL (ref 8.3–10.1)
CHLORIDE SERPL-SCNC: 100 MMOL/L (ref 100–108)
CO2 SERPL-SCNC: 31 MMOL/L (ref 21–32)
CREAT SERPL-MCNC: 0.99 MG/DL (ref 0.6–1.3)
EOSINOPHIL # BLD AUTO: 0.16 THOUSAND/ΜL (ref 0–0.61)
EOSINOPHIL NFR BLD AUTO: 1 % (ref 0–6)
ERYTHROCYTE [DISTWIDTH] IN BLOOD BY AUTOMATED COUNT: 13 % (ref 11.6–15.1)
GFR SERPL CREATININE-BSD FRML MDRD: 91 ML/MIN/1.73SQ M
GLUCOSE SERPL-MCNC: 95 MG/DL (ref 65–140)
HCT VFR BLD AUTO: 45.6 % (ref 36.5–49.3)
HGB BLD-MCNC: 15.3 G/DL (ref 12–17)
IMM GRANULOCYTES # BLD AUTO: 0.07 THOUSAND/UL (ref 0–0.2)
IMM GRANULOCYTES NFR BLD AUTO: 1 % (ref 0–2)
INR PPP: 1.03 (ref 0.86–1.17)
LYMPHOCYTES # BLD AUTO: 2.48 THOUSANDS/ΜL (ref 0.6–4.47)
LYMPHOCYTES NFR BLD AUTO: 17 % (ref 14–44)
MAGNESIUM SERPL-MCNC: 2.1 MG/DL (ref 1.6–2.6)
MCH RBC QN AUTO: 32.8 PG (ref 26.8–34.3)
MCHC RBC AUTO-ENTMCNC: 33.6 G/DL (ref 31.4–37.4)
MCV RBC AUTO: 98 FL (ref 82–98)
MONOCYTES # BLD AUTO: 1.56 THOUSAND/ΜL (ref 0.17–1.22)
MONOCYTES NFR BLD AUTO: 11 % (ref 4–12)
NEUTROPHILS # BLD AUTO: 10.43 THOUSANDS/ΜL (ref 1.85–7.62)
NEUTS SEG NFR BLD AUTO: 70 % (ref 43–75)
NRBC BLD AUTO-RTO: 0 /100 WBCS
PLATELET # BLD AUTO: 330 THOUSANDS/UL (ref 149–390)
PMV BLD AUTO: 11.3 FL (ref 8.9–12.7)
POTASSIUM SERPL-SCNC: 4.3 MMOL/L (ref 3.5–5.3)
PROT SERPL-MCNC: 7.9 G/DL (ref 6.4–8.2)
PROTHROMBIN TIME: 13.4 SECONDS (ref 11.8–14.2)
RBC # BLD AUTO: 4.66 MILLION/UL (ref 3.88–5.62)
SODIUM SERPL-SCNC: 137 MMOL/L (ref 136–145)
TROPONIN I SERPL-MCNC: <0.02 NG/ML
WBC # BLD AUTO: 14.75 THOUSAND/UL (ref 4.31–10.16)

## 2018-09-07 PROCEDURE — 36415 COLL VENOUS BLD VENIPUNCTURE: CPT | Performed by: EMERGENCY MEDICINE

## 2018-09-07 PROCEDURE — 85025 COMPLETE CBC W/AUTO DIFF WBC: CPT | Performed by: EMERGENCY MEDICINE

## 2018-09-07 PROCEDURE — 74177 CT ABD & PELVIS W/CONTRAST: CPT

## 2018-09-07 PROCEDURE — 84484 ASSAY OF TROPONIN QUANT: CPT | Performed by: EMERGENCY MEDICINE

## 2018-09-07 PROCEDURE — 80053 COMPREHEN METABOLIC PANEL: CPT | Performed by: EMERGENCY MEDICINE

## 2018-09-07 PROCEDURE — 96366 THER/PROPH/DIAG IV INF ADDON: CPT

## 2018-09-07 PROCEDURE — 71045 X-RAY EXAM CHEST 1 VIEW: CPT

## 2018-09-07 PROCEDURE — 94640 AIRWAY INHALATION TREATMENT: CPT

## 2018-09-07 PROCEDURE — 71275 CT ANGIOGRAPHY CHEST: CPT

## 2018-09-07 PROCEDURE — 96375 TX/PRO/DX INJ NEW DRUG ADDON: CPT

## 2018-09-07 PROCEDURE — 93005 ELECTROCARDIOGRAM TRACING: CPT

## 2018-09-07 PROCEDURE — 96365 THER/PROPH/DIAG IV INF INIT: CPT

## 2018-09-07 PROCEDURE — 83735 ASSAY OF MAGNESIUM: CPT | Performed by: EMERGENCY MEDICINE

## 2018-09-07 PROCEDURE — 85610 PROTHROMBIN TIME: CPT | Performed by: EMERGENCY MEDICINE

## 2018-09-07 PROCEDURE — 99285 EMERGENCY DEPT VISIT HI MDM: CPT

## 2018-09-07 RX ORDER — KETOROLAC TROMETHAMINE 30 MG/ML
15 INJECTION, SOLUTION INTRAMUSCULAR; INTRAVENOUS ONCE
Status: COMPLETED | OUTPATIENT
Start: 2018-09-07 | End: 2018-09-07

## 2018-09-07 RX ORDER — LEVOFLOXACIN 500 MG/1
500 TABLET, FILM COATED ORAL DAILY
Qty: 10 TABLET | Refills: 0 | Status: SHIPPED | OUTPATIENT
Start: 2018-09-07 | End: 2018-09-07

## 2018-09-07 RX ORDER — ALBUTEROL SULFATE 90 UG/1
2 AEROSOL, METERED RESPIRATORY (INHALATION) EVERY 4 HOURS PRN
Qty: 1 INHALER | Refills: 0 | Status: SHIPPED | OUTPATIENT
Start: 2018-09-07

## 2018-09-07 RX ORDER — ALBUTEROL SULFATE 2.5 MG/3ML
5 SOLUTION RESPIRATORY (INHALATION) ONCE
Status: COMPLETED | OUTPATIENT
Start: 2018-09-07 | End: 2018-09-07

## 2018-09-07 RX ORDER — LEVOFLOXACIN 5 MG/ML
750 INJECTION, SOLUTION INTRAVENOUS ONCE
Status: COMPLETED | OUTPATIENT
Start: 2018-09-07 | End: 2018-09-07

## 2018-09-07 RX ORDER — ALBUTEROL SULFATE 90 UG/1
2 AEROSOL, METERED RESPIRATORY (INHALATION) EVERY 4 HOURS PRN
Qty: 8 G | Refills: 0 | Status: SHIPPED | OUTPATIENT
Start: 2018-09-07 | End: 2018-09-18 | Stop reason: HOSPADM

## 2018-09-07 RX ORDER — ALBUTEROL SULFATE 2.5 MG/3ML
2.5 SOLUTION RESPIRATORY (INHALATION) EVERY 6 HOURS PRN
Qty: 10 VIAL | Refills: 0 | Status: SHIPPED | OUTPATIENT
Start: 2018-09-07

## 2018-09-07 RX ORDER — LEVOFLOXACIN 500 MG/1
500 TABLET, FILM COATED ORAL DAILY
Qty: 10 TABLET | Refills: 0 | Status: SHIPPED | OUTPATIENT
Start: 2018-09-07 | End: 2018-09-18 | Stop reason: HOSPADM

## 2018-09-07 RX ADMIN — LEVOFLOXACIN 750 MG: 5 INJECTION, SOLUTION INTRAVENOUS at 18:15

## 2018-09-07 RX ADMIN — KETOROLAC TROMETHAMINE 15 MG: 30 INJECTION, SOLUTION INTRAMUSCULAR at 17:01

## 2018-09-07 RX ADMIN — IOHEXOL 100 ML: 350 INJECTION, SOLUTION INTRAVENOUS at 17:27

## 2018-09-07 RX ADMIN — ALBUTEROL SULFATE 5 MG: 2.5 SOLUTION RESPIRATORY (INHALATION) at 18:22

## 2018-09-07 NOTE — ED PROVIDER NOTES
History  Chief Complaint   Patient presents with    Abdominal Pain     Pt c/o abdominal pain, left sided back pain and swelling with sharp shooting right sided rib pain and SOB that started today  Pt states he thought he injured his back after lifting shingles 3 days ago but symptoms are worsening  Pt denies N/V/D     82-year-old male patient presents emergency department for evaluation of abdominal and chest pain  The patient's discomfort is more the left side of the lower chest, left flank, left lower quadrant of the abdomen, left upper quadrant of the abdomen  The patient states this is been ongoing for several weeks  The patient has the ready been diagnosed with COPD, states he has a productive cough of green sputum and mucus, states no fevers but does state that with this pain he does become very short of breath  Patient has diminished breath sounds equally bilaterally and stat portable chest x-ray will be done to assure that there is no pneumothorax  Patient will then have a CT scan of the chest abdomen and pelvis done to assess for multiple causes of his chest and abdominal discomfort including but not being limited to splenomegaly, neoplastic changes in the lungs and abdomen, diverticulosis, diverticulitis  History provided by:  Patient   used: No    Cough   Cough characteristics:  Productive  Sputum characteristics:  Green  Severity:  Moderate  Onset quality:  Gradual  Timing:  Constant  Progression:  Worsening  Chronicity:  Recurrent  Smoker: yes    Context: upper respiratory infection    Context: not animal exposure    Relieved by:  Nothing  Worsened by:  Nothing  Ineffective treatments:  None tried  Associated symptoms: myalgias, shortness of breath and wheezing    Associated symptoms: no diaphoresis        Prior to Admission Medications   Prescriptions Last Dose Informant Patient Reported? Taking?    albuterol (2 5 mg/3 mL) 0 083 % nebulizer solution 9/6/2018 at Unknown time  No Yes   Sig: Take 1 vial (2 5 mg total) by nebulization every 6 (six) hours as needed for shortness of breath   albuterol (VENTOLIN HFA) 90 mcg/act inhaler 9/6/2018 at Unknown time  No Yes   Sig: Inhale 2 puffs every 4 (four) hours as needed for wheezing   allopurinol (ZYLOPRIM) 300 mg tablet 9/6/2018 at Unknown time  No Yes   Sig: Take 1 tablet (300 mg total) by mouth daily   indomethacin (INDOCIN) 50 mg capsule 9/6/2018 at Unknown time  No Yes   Sig: Take 1 capsule (50 mg total) by mouth 3 (three) times a day with meals   losartan (COZAAR) 100 MG tablet 9/6/2018 at Unknown time  No Yes   Sig: Take 1 tablet (100 mg total) by mouth daily   methocarbamol (ROBAXIN) 750 mg tablet Unknown at Unknown time  Yes No   Sig: Take 750 mg by mouth 4 (four) times a day   naproxen (NAPROSYN) 500 mg tablet   No No   Sig: Take 1 tablet (500 mg total) by mouth 2 (two) times a day with meals for 10 days      Facility-Administered Medications: None       Past Medical History:   Diagnosis Date    COPD (chronic obstructive pulmonary disease) (HCC)     [facility] lung [facility] [address] )     Gout     Hypertension        Past Surgical History:   Procedure Laterality Date    APPENDECTOMY      BACK SURGERY      JOINT REPLACEMENT Right     knee    REPLACEMENT TOTAL KNEE Right        Family History   Problem Relation Age of Onset    Coronary artery disease Mother     Hypertension Mother     Diabetes Mother     Asthma Mother     COPD Mother     Thrombosis Mother    Clarisa Koehler Arthritis Mother     Hyperlipidemia Sister     Thyroid disease Sister     Breast cancer Sister      I have reviewed and agree with the history as documented  Social History   Substance Use Topics    Smoking status: Current Every Day Smoker     Packs/day: 1 00     Types: Cigarettes    Smokeless tobacco: Never Used    Alcohol use Yes      Comment: occasional        Review of Systems   Constitutional: Negative for diaphoresis     Respiratory: Positive for cough, shortness of breath and wheezing  Musculoskeletal: Positive for myalgias  All other systems reviewed and are negative  Physical Exam  Physical Exam   Constitutional: He is oriented to person, place, and time  He appears well-developed and well-nourished  No distress  HENT:   Head: Normocephalic and atraumatic  Right Ear: External ear normal    Left Ear: External ear normal    Eyes: Conjunctivae and EOM are normal  Right eye exhibits no discharge  Left eye exhibits no discharge  No scleral icterus  Neck: Normal range of motion  Neck supple  No JVD present  No tracheal deviation present  No thyromegaly present  Cardiovascular: Normal rate and regular rhythm  Pulmonary/Chest: Effort normal  No stridor  No respiratory distress  He has wheezes  He has rales  Abdominal: Soft  Bowel sounds are normal  He exhibits no distension  There is no tenderness  Musculoskeletal: Normal range of motion  He exhibits no edema, tenderness or deformity  Neurological: He is alert and oriented to person, place, and time  No cranial nerve deficit  Coordination normal    Skin: Skin is warm and dry  He is not diaphoretic  Psychiatric: He has a normal mood and affect  His behavior is normal    Nursing note and vitals reviewed        Vital Signs  ED Triage Vitals [09/07/18 1543]   Temperature Pulse Respirations Blood Pressure SpO2   97 6 °F (36 4 °C) 88 22 159/86 95 %      Temp Source Heart Rate Source Patient Position - Orthostatic VS BP Location FiO2 (%)   Oral Monitor Sitting Right arm --      Pain Score       Worst Possible Pain           Vitals:    09/07/18 1636 09/07/18 1704 09/07/18 1825 09/07/18 1950   BP: 148/74 157/84 150/82 150/82   Pulse: 73 71 74 74   Patient Position - Orthostatic VS: Sitting Sitting Sitting Lying       Visual Acuity      ED Medications  Medications   ketorolac (TORADOL) injection 15 mg (15 mg Intravenous Given 9/7/18 1701)   iohexol (OMNIPAQUE) 350 MG/ML injection (MULTI-DOSE) 100 mL (100 mL Intravenous Given 9/7/18 1727)   levofloxacin (LEVAQUIN) IVPB (premix) 750 mg (0 mg Intravenous Stopped 9/7/18 1951)   albuterol inhalation solution 5 mg (5 mg Nebulization Given 9/7/18 1822)       Diagnostic Studies  Results Reviewed     Procedure Component Value Units Date/Time    Troponin I [[id number]]  (Normal) Collected:  09/07/18 1603    Lab Status:  Final result Specimen:  Blood from Arm, Left Updated:  09/07/18 1631     Troponin I <0 02 ng/mL     Comprehensive metabolic panel [[id number]] Collected:  09/07/18 1603    Lab Status:  Final result Specimen:  Blood from Arm, Left Updated:  09/07/18 1628     Sodium 137 mmol/L      Potassium 4 3 mmol/L      Chloride 100 mmol/L      CO2 31 mmol/L      ANION GAP 6 mmol/L      BUN 13 mg/dL      Creatinine 0 99 mg/dL      Glucose 95 mg/dL      Calcium 9 3 mg/dL      AST 34 U/L      ALT 45 U/L      Alkaline Phosphatase 81 U/L      Total Protein 7 9 g/dL      Albumin 3 8 g/dL      Total Bilirubin 0 50 mg/dL      eGFR 91 ml/min/1 73sq m     Narrative:         National Kidney Disease Education Program recommendations are as follows:  GFR calculation is accurate only with a steady state creatinine  Chronic Kidney disease less than 60 ml/min/1 73 sq  meters  Kidney failure less than 15 ml/min/1 73 sq  meters      Magnesium [[id number]]  (Normal) Collected:  09/07/18 1603    Lab Status:  Final result Specimen:  Blood from Arm, Left Updated:  09/07/18 1628     Magnesium 2 1 mg/dL     Protime-INR [[id number]]  (Normal) Collected:  09/07/18 1603    Lab Status:  Final result Specimen:  Blood from Arm, Left Updated:  09/07/18 1624     Protime 13 4 seconds      INR 1 03    CBC and differential [[id number]]  (Abnormal) Collected:  09/07/18 1603    Lab Status:  Final result Specimen:  Blood from Arm, Left Updated:  09/07/18 1613     WBC 14 75 (H) Thousand/uL      RBC 4 66 Million/uL      Hemoglobin 15 3 g/dL      Hematocrit 45 6 %      MCV 98 fL      MCH 32 8 pg      MCHC 33 6 g/dL RDW 13 0 %      MPV 11 3 fL      Platelets 030 Thousands/uL      nRBC 0 /100 WBCs      Neutrophils Relative 70 %      Immat GRANS % 1 %      Lymphocytes Relative 17 %      Monocytes Relative 11 %      Eosinophils Relative 1 %      Basophils Relative 0 %      Neutrophils Absolute 10 43 (H) Thousands/µL      Immature Grans Absolute 0 07 Thousand/uL      Lymphocytes Absolute 2 48 Thousands/µL      Monocytes Absolute 1 56 (H) Thousand/µL      Eosinophils Absolute 0 16 Thousand/µL      Basophils Absolute 0 05 Thousands/µL                  CT pe study w abdomen pelvis w contrast   Final Result by Jenaro Swan MD (09/07 1804)      Mild central and bibasilar bronchial wall thickening in keeping with a nonspecific bronchitis  No pulmonary arterial embolism or focal pulmonary consolidation  No acute inflammatory findings in the abdomen or pelvis  The study was marked in EPIC for significant notification  Workstation performed: [id number]         XR chest 1 view portable   Final Result by Nicolette Davidson MD (09/07 1621)      Small left basilar infiltrate/atelectasis  No pneumothorax  Workstation performed: [id number]                    Procedures  Procedures       Phone Contacts  ED Phone Contact    ED Course                               MDM  Number of Diagnoses or Management Options  COPD (chronic obstructive pulmonary disease) ([facility] [address] ): new and requires workup  Pneumonia: new and requires workup  Diagnosis management comments: EKG shows SR, ventricular rate of 83  No ectopy   Reviewed and interpreted by me at 1549       Amount and/or Complexity of Data Reviewed  Clinical lab tests: ordered and reviewed  Decide to obtain previous medical records or to obtain history from someone other than the patient: yes  Review and summarize past medical records: yes  Independent visualization of images, tracings, or specimens: yes    Patient Progress  Patient progress: stable    CritCare Time    Disposition  Final diagnoses:   [facility]   Pneumonia     Time reflects when diagnosis was documented in both MDM as applicable and the Disposition within this note     Time User Action Codes Description Comment    9/7/2018  7:47 PM Helon  Add [J44 9] COPD (chronic obstructive pulmonary disease) ([facility] [address] )     9/7/2018  7:47 PM Helon  Add [J18 9] Pneumonia     9/7/2018  8:11 PM Helon  Add [E03 9] Hypothyroidism, unspecified type     9/7/2018  8:11 PM Helon  Add [J44 9] Chronic obstructive pulmonary disease, unspecified COPD type ([facility] )     9/7/2018  8:11 PM Helon  Add [I10] Benign essential hypertension     9/7/2018  8:11 PM Helon  Add [M54 5,  G89 29] Chronic low back pain, unspecified back pain laterality, with sciatica presence unspecified     9/7/2018  8:11 PM Helon  Add [E78 2] Mixed hyperlipidemia     9/7/2018  8:11 PM Helon  Add [Z72 0] Tobacco abuse     9/7/2018  8:11 PM Helon  Add [R06 02] SOB (shortness of breath)     9/7/2018  8:11 PM Helon  Add [R06 02] Shortness of breath     9/7/2018  8:11 PM Helon  Add [R06 2] Wheezing     9/7/2018  8:11 PM Helon  Add [J41 0] Simple chronic bronchitis ([facility] )     9/7/2018  8:11 PM Helon  Modify [E03 9] Hypothyroidism, unspecified type     9/7/2018  8:11 PM Johnathon, [address] Benign essential hypertension     9/7/2018  8:11 PM Helon  Modify [M54 5,  G89 29] Chronic low back pain, unspecified back pain laterality, with sciatica presence unspecified     9/7/2018  8:11 PM Helon  Modify [E78 2] Mixed hyperlipidemia     9/7/2018  8:11 PM Helon  Modify [Z72 0] Tobacco abuse     9/7/2018  8:11 PM Helon  Modify [R06 02] SOB (shortness of breath)     9/7/2018  8:11 PM Helon  Modify [E03 9] Hypothyroidism, unspecified type     9/7/2018  8:11 PM Helon  Modify [I10] Benign essential hypertension 9/7/2018  8:11 PM Tony Rocha Modify [M54 5,  G89 29] Chronic low back pain, unspecified back pain laterality, with sciatica presence unspecified     9/7/2018  8:11 PM Tony Rocha Modify [E78 2] Mixed hyperlipidemia     9/7/2018  8:11 PM Tony Rocha Modify [Z72 0] Tobacco abuse     9/7/2018  8:11 PM Tony Rcoha Modify [R06 02] SOB (shortness of breath)     9/7/2018  8:11 PM Tony Rocha Modify [E03 9] Hypothyroidism, unspecified type     9/7/2018  8:11 PM Tony Rocha Modify [I10] Benign essential hypertension     9/7/2018  8:11 PM Tony Rocha Modify [M54 5,  G89 29] Chronic low back pain, unspecified back pain laterality, with sciatica presence unspecified     9/7/2018  8:11 PM Tony Rocha Modify [E78 2] Mixed hyperlipidemia     9/7/2018  8:11 PM Tony Rocha Modify [Z72 0] Tobacco abuse     9/7/2018  8:11 PM Tony Rocha Modify [R06 02] SOB (shortness of breath)     9/7/2018  8:11 PM Tony Rocha Modify [E03 9] Hypothyroidism, unspecified type     9/7/2018  8:11 PM Tony Rocha Modify [I10] Benign essential hypertension     9/7/2018  8:11 PM Tony Rocha Modify [M54 5,  G89 29] Chronic low back pain, unspecified back pain laterality, with sciatica presence unspecified     9/7/2018  8:11 PM Tony Rocha Modify [E78 2] Mixed hyperlipidemia     9/7/2018  8:11 PM Tony Rocha Modify [Z72 0] Tobacco abuse     9/7/2018  8:11 PM Tony Rocha Modify [R06 02] SOB (shortness of breath)       ED Disposition     ED Disposition Condition Comment    Discharge  [place] discharge to home/self care  Condition at discharge: Stable        Follow-up Information     Follow up With Specialties Details Why Leonora Best MD Pulmonology, Neurology, Pulmonary Disease, Sleep Medicine   [address] [place] [address]  [phone]            Discharge Medication List as of 9/7/2018  8:02 PM      START taking these medications    Details   !! albuterol (PROVENTIL HFA,VENTOLIN HFA) 90 mcg/act inhaler Inhale 2 puffs every 4 (four) hours as needed for wheezing, Starting Fri 9/7/2018, Normal      levofloxacin (LEVAQUIN) 500 mg tablet Take 1 tablet (500 mg total) by mouth daily for 10 days, Starting Fri 9/7/2018, Until Mon 9/17/2018, Normal       !! - Potential duplicate medications found  Please discuss with provider  CONTINUE these medications which have NOT CHANGED    Details   allopurinol (ZYLOPRIM) 300 mg tablet Take 1 tablet (300 mg total) by mouth daily, Starting Mon 7/16/2018, Normal      indomethacin (INDOCIN) 50 mg capsule Take 1 capsule (50 mg total) by mouth 3 (three) times a day with meals, Starting Mon 7/16/2018, Normal      losartan (COZAAR) 100 MG tablet Take 1 tablet (100 mg total) by mouth daily, Starting Mon 7/16/2018, Normal      albuterol (2 5 mg/3 mL) 0 083 % nebulizer solution Take 1 vial (2 5 mg total) by nebulization every 6 (six) hours as needed for shortness of breath, Starting Mon 7/16/2018, Normal      !! albuterol (VENTOLIN HFA) 90 mcg/act inhaler Inhale 2 puffs every 4 (four) hours as needed for wheezing, Starting Mon 7/16/2018, Normal      methocarbamol (ROBAXIN) 750 mg tablet Take 750 mg by mouth 4 (four) times a day, Until Discontinued, Historical Med      naproxen (NAPROSYN) 500 mg tablet Take 1 tablet (500 mg total) by mouth 2 (two) times a day with meals for 10 days, Starting Sun 8/19/2018, Until Wed 8/29/2018, Print       !! - Potential duplicate medications found  Please discuss with provider  No discharge procedures on file      ED Provider  Electronically Signed by           Alfredo Gonzalez, DO  09/08/18 [address] [facility] [address], DO  09/08/18 6092

## 2018-09-08 LAB
ATRIAL RATE: 83 BPM
P AXIS: 68 DEGREES
PR INTERVAL: 130 MS
QRS AXIS: 86 DEGREES
QRSD INTERVAL: 94 MS
QT INTERVAL: 360 MS
QTC INTERVAL: 423 MS
T WAVE AXIS: 84 DEGREES
VENTRICULAR RATE: 83 BPM

## 2018-09-08 PROCEDURE — 93010 ELECTROCARDIOGRAM REPORT: CPT | Performed by: INTERNAL MEDICINE

## 2018-09-08 NOTE — DISCHARGE INSTRUCTIONS
COPD (Chronic Obstructive Pulmonary Disease)   WHAT YOU NEED TO KNOW:   Chronic obstructive pulmonary disease (COPD) is a lung disease that makes it hard for you to breathe  It is usually a result of lung damage caused by years of irritation and inflammation in your lungs  DISCHARGE INSTRUCTIONS:   Call 911 if:   · You feel lightheaded, short of breath, and have chest pain  Return to the emergency department if:   · You are confused, dizzy, or feel faint  · Your arm or leg feels warm, tender, and painful  It may look swollen and red  · You cough up blood  Contact your healthcare provider if:   · You have more shortness of breath than usual      · You need more medicine than usual to control your symptoms  · You are coughing or wheezing more than usual      · You are coughing up more mucus, or it is a different color or has a different odor  · You gain more than 3 pounds in a week  · You have a fever, a runny or stuffy nose, and a sore throat, or other cold or flu symptoms  · Your skin, lips, or nails start to turn blue  · You have swelling in your legs or ankles  · You are very tired or weak for more than a day  · You notice changes in your mood, or changes in your ability to think or concentrate  · You have questions or concerns about your condition or care  Medicines:   · Medicines  may be used to open your airways, decrease swelling and inflammation in your lungs, or treat an infection  You may need 2 or more medicines  A short-acting medicine relieves symptoms quickly  Long-acting medicines will control or prevent symptoms  Ask for more information about the medicines you are given and how to use them safely  · Take your medicine as directed  Contact your healthcare provider if you think your medicine is not helping or if you have side effects  Tell him or her if you are allergic to any medicine  Keep a list of the medicines, vitamins, and herbs you take  Include the amounts, and when and why you take them  Bring the list or the pill bottles to follow-up visits  Carry your medicine list with you in case of an emergency  Help make breathing easier:   · Use pursed-lip breathing any time you feel short of breath  Take a deep breath in through your nose  Slowly breathe out through your mouth with your lips pursed for twice as long as you inhaled  You can also practice this breathing pattern while you bend, lift, climb stairs, or exercise  It slows down your breathing and helps move more air in and out of your lungs  · Do not smoke, and avoid others who smoke  Nicotine and other substances can cause lung irritation or damage and make it harder for you to breathe  Do not use e-cigarettes or smokeless tobacco  They still contain nicotine  Ask your healthcare provider for information if you currently smoke and need help to quit  For support and more information:  ¨ [employer]  Phone: 6- 634 - [phone]  Web Address: [employer]      · Be aware of and avoid anything that makes your symptoms worse  Stay out of high altitudes and places with high humidity  Stay inside, or cover your mouth and nose with a scarf when you are outside during cold weather  Stay inside on days when air pollution or pollen counts are high  Do not use aerosol sprays such as deodorant, bug spray, and hair spray  Manage COPD and help prevent exacerbations:  COPD is a serious condition that gets worse over time  A COPD exacerbation means your symptoms suddenly get worse  It is important to prevent exacerbations  An exacerbation can cause more lung damage  COPD cannot be cured, but you can take action to feel better and prevent COPD exacerbations:  · Protect yourself from germs  Germs can get into your lungs and cause an infection  An infection in your lungs can create more mucus and make it harder to breathe   An infection can also create swelling in your airways and prevent air from getting in  You can decrease your risk for infection by doing the following:     [facility] your hands often with soap and water  Carry germ-killing gel with you  You can use the gel to clean your hands when soap and water are not available  ¨ Do not touch your eyes, nose, or mouth unless you have washed your hands first      ¨ Always cover your mouth when you cough  Cough into a tissue or your shirtsleeve so you do not spread germs from your hands  ¨ Try to avoid people who have a cold or the flu  If you are sick, stay away from others as much as possible  · Drink more liquids  This will help to keep your air passages moist and help you cough up mucus  Ask how much liquid to drink each day and which liquids are best for you  · Exercise daily  Exercise for at least 20 minutes each day to help increase your energy and decrease shortness of breath  Walking or riding a bike are good ways to exercise  Talk to your healthcare provider about the best exercise plan for you  · Ask about vaccines  Your healthcare provider may recommend that you get regular flu and pneumonia vaccines  Pneumonia can become life-threatening for a person who has COPD  Ask about other vaccines you may need  Ask your healthcare provider about the flu and pneumonia vaccines  All adults should get the flu (influenza) vaccine every year as soon as it becomes available  The pneumonia vaccine is given to adults aged 72 or older to prevent pneumococcal disease, such as pneumonia  Adults aged 23 to 59 years who are at high risk for pneumococcal disease also should get the pneumococcal vaccine  It may need to be repeated 1 or 5 years later  Pulmonary rehabilitation:  Your healthcare provider may recommend a program to help you manage your symptoms and improve your quality of life  It may include nutritional counseling and exercise to strengthen your lungs     Make decisions about your choices for future treatment:  Ask for information about advanced medical directives and living jones  These documents help you decide and write down your choices for treatment and end-of-life care  It is best to complete them when you feel well and can think clearly about your wishes  The information can then be kept for future use if you are in the hospital or become very ill  Follow up with your healthcare provider as directed: You may need more tests  Your healthcare provider may refer you to a pulmonary (lung) specialist  Write down your questions so you remember to ask them during your visits  © 2017 [facility] Information is for End User's use only and may not be sold, redistributed or otherwise used for commercial purposes  All illustrations and images included in CareNotes® are the copyrighted property of A D A M [employer] Inc  or Andres Willis  The above information is an  only  It is not intended as medical advice for individual conditions or treatments  Talk to your doctor, nurse or pharmacist before following any medical regimen to see if it is safe and effective for you

## 2018-09-13 ENCOUNTER — HOSPITAL ENCOUNTER (INPATIENT)
Facility: HOSPITAL | Age: 48
LOS: 2 days | Discharge: HOME | DRG: 812 | End: 2018-09-18
Attending: EMERGENCY MEDICINE | Admitting: INTERNAL MEDICINE
Payer: COMMERCIAL

## 2018-09-13 DIAGNOSIS — T40.601A OVERDOSE OF OPIATE OR RELATED NARCOTIC, ACCIDENTAL OR UNINTENTIONAL, INITIAL ENCOUNTER (HCC): ICD-10-CM

## 2018-09-13 DIAGNOSIS — T40.2X1A: Primary | ICD-10-CM

## 2018-09-13 DIAGNOSIS — Z72.0 TOBACCO ABUSE: ICD-10-CM

## 2018-09-13 DIAGNOSIS — R77.8 TROPONIN LEVEL ELEVATED: ICD-10-CM

## 2018-09-13 DIAGNOSIS — R77.8 ELEVATED TROPONIN: ICD-10-CM

## 2018-09-13 PROCEDURE — 93005 ELECTROCARDIOGRAM TRACING: CPT

## 2018-09-14 ENCOUNTER — APPOINTMENT (EMERGENCY)
Dept: CT IMAGING | Facility: HOSPITAL | Age: 48
DRG: 812 | End: 2018-09-14
Payer: COMMERCIAL

## 2018-09-14 ENCOUNTER — APPOINTMENT (EMERGENCY)
Dept: RADIOLOGY | Facility: HOSPITAL | Age: 48
DRG: 812 | End: 2018-09-14
Payer: COMMERCIAL

## 2018-09-14 PROBLEM — T50.901A OVERDOSE: Status: ACTIVE | Noted: 2018-09-14

## 2018-09-14 LAB
ALBUMIN SERPL BCP-MCNC: 3.2 G/DL (ref 3.5–5)
ALP SERPL-CCNC: 80 U/L (ref 46–116)
ALT SERPL W P-5'-P-CCNC: 53 U/L (ref 12–78)
ANION GAP SERPL CALCULATED.3IONS-SCNC: 6 MMOL/L (ref 4–13)
AST SERPL W P-5'-P-CCNC: 46 U/L (ref 5–45)
ATRIAL RATE: 102 BPM
ATRIAL RATE: 84 BPM
ATRIAL RATE: 97 BPM
BASOPHILS # BLD AUTO: 0.09 THOUSANDS/ΜL (ref 0–0.1)
BASOPHILS NFR BLD AUTO: 1 % (ref 0–1)
BILIRUB SERPL-MCNC: 0.1 MG/DL (ref 0.2–1)
BILIRUB UR QL STRIP: NEGATIVE
BUN SERPL-MCNC: 11 MG/DL (ref 5–25)
CALCIUM SERPL-MCNC: 9.1 MG/DL (ref 8.3–10.1)
CHLORIDE SERPL-SCNC: 99 MMOL/L (ref 100–108)
CLARITY UR: CLEAR
CO2 SERPL-SCNC: 32 MMOL/L (ref 21–32)
COLOR UR: YELLOW
CREAT SERPL-MCNC: 1.33 MG/DL (ref 0.6–1.3)
EOSINOPHIL # BLD AUTO: 0.2 THOUSAND/ΜL (ref 0–0.61)
EOSINOPHIL NFR BLD AUTO: 1 % (ref 0–6)
ERYTHROCYTE [DISTWIDTH] IN BLOOD BY AUTOMATED COUNT: 12.9 % (ref 11.6–15.1)
GFR SERPL CREATININE-BSD FRML MDRD: 64 ML/MIN/1.73SQ M
GLUCOSE SERPL-MCNC: 131 MG/DL (ref 65–140)
GLUCOSE SERPL-MCNC: 144 MG/DL (ref 65–140)
GLUCOSE UR STRIP-MCNC: NEGATIVE MG/DL
HCT VFR BLD AUTO: 44 % (ref 36.5–49.3)
HGB BLD-MCNC: 14.4 G/DL (ref 12–17)
HGB UR QL STRIP.AUTO: NEGATIVE
IMM GRANULOCYTES # BLD AUTO: 0.3 THOUSAND/UL (ref 0–0.2)
IMM GRANULOCYTES NFR BLD AUTO: 2 % (ref 0–2)
KETONES UR STRIP-MCNC: NEGATIVE MG/DL
LEUKOCYTE ESTERASE UR QL STRIP: NEGATIVE
LYMPHOCYTES # BLD AUTO: 2.55 THOUSANDS/ΜL (ref 0.6–4.47)
LYMPHOCYTES NFR BLD AUTO: 15 % (ref 14–44)
MCH RBC QN AUTO: 32.5 PG (ref 26.8–34.3)
MCHC RBC AUTO-ENTMCNC: 32.7 G/DL (ref 31.4–37.4)
MCV RBC AUTO: 99 FL (ref 82–98)
MONOCYTES # BLD AUTO: 1.06 THOUSAND/ΜL (ref 0.17–1.22)
MONOCYTES NFR BLD AUTO: 6 % (ref 4–12)
NEUTROPHILS # BLD AUTO: 13.1 THOUSANDS/ΜL (ref 1.85–7.62)
NEUTS SEG NFR BLD AUTO: 75 % (ref 43–75)
NITRITE UR QL STRIP: NEGATIVE
NRBC BLD AUTO-RTO: 0 /100 WBCS
NT-PROBNP SERPL-MCNC: 103 PG/ML
P AXIS: 67 DEGREES
P AXIS: 72 DEGREES
P AXIS: 72 DEGREES
PH UR STRIP.AUTO: 5.5 [PH] (ref 4.5–8)
PLATELET # BLD AUTO: 382 THOUSANDS/UL (ref 149–390)
PMV BLD AUTO: 10.8 FL (ref 8.9–12.7)
POTASSIUM SERPL-SCNC: 4.2 MMOL/L (ref 3.5–5.3)
PR INTERVAL: 134 MS
PR INTERVAL: 134 MS
PR INTERVAL: 136 MS
PROT SERPL-MCNC: 7.3 G/DL (ref 6.4–8.2)
PROT UR STRIP-MCNC: NEGATIVE MG/DL
QRS AXIS: 85 DEGREES
QRS AXIS: 86 DEGREES
QRS AXIS: 88 DEGREES
QRSD INTERVAL: 102 MS
QRSD INTERVAL: 96 MS
QRSD INTERVAL: 98 MS
QT INTERVAL: 348 MS
QT INTERVAL: 368 MS
QT INTERVAL: 374 MS
QTC INTERVAL: 441 MS
QTC INTERVAL: 453 MS
QTC INTERVAL: 467 MS
RBC # BLD AUTO: 4.43 MILLION/UL (ref 3.88–5.62)
SODIUM SERPL-SCNC: 137 MMOL/L (ref 136–145)
SP GR UR STRIP.AUTO: 1.02 (ref 1–1.03)
T WAVE AXIS: 36 DEGREES
T WAVE AXIS: 45 DEGREES
T WAVE AXIS: 51 DEGREES
TROPONIN I SERPL-MCNC: 0.14 NG/ML
TROPONIN I SERPL-MCNC: 0.22 NG/ML
UROBILINOGEN UR QL STRIP.AUTO: 0.2 E.U./DL
VENTRICULAR RATE: 102 BPM
VENTRICULAR RATE: 84 BPM
VENTRICULAR RATE: 97 BPM
WBC # BLD AUTO: 17.3 THOUSAND/UL (ref 4.31–10.16)

## 2018-09-14 PROCEDURE — 85025 COMPLETE CBC W/AUTO DIFF WBC: CPT | Performed by: EMERGENCY MEDICINE

## 2018-09-14 PROCEDURE — 82948 REAGENT STRIP/BLOOD GLUCOSE: CPT

## 2018-09-14 PROCEDURE — 83880 ASSAY OF NATRIURETIC PEPTIDE: CPT | Performed by: EMERGENCY MEDICINE

## 2018-09-14 PROCEDURE — 99285 EMERGENCY DEPT VISIT HI MDM: CPT

## 2018-09-14 PROCEDURE — 93010 ELECTROCARDIOGRAM REPORT: CPT | Performed by: INTERNAL MEDICINE

## 2018-09-14 PROCEDURE — 84484 ASSAY OF TROPONIN QUANT: CPT | Performed by: EMERGENCY MEDICINE

## 2018-09-14 PROCEDURE — 74177 CT ABD & PELVIS W/CONTRAST: CPT

## 2018-09-14 PROCEDURE — 93005 ELECTROCARDIOGRAM TRACING: CPT

## 2018-09-14 PROCEDURE — 81003 URINALYSIS AUTO W/O SCOPE: CPT | Performed by: EMERGENCY MEDICINE

## 2018-09-14 PROCEDURE — 71046 X-RAY EXAM CHEST 2 VIEWS: CPT

## 2018-09-14 PROCEDURE — 96374 THER/PROPH/DIAG INJ IV PUSH: CPT

## 2018-09-14 PROCEDURE — 80053 COMPREHEN METABOLIC PANEL: CPT | Performed by: EMERGENCY MEDICINE

## 2018-09-14 PROCEDURE — 96361 HYDRATE IV INFUSION ADD-ON: CPT

## 2018-09-14 PROCEDURE — 36415 COLL VENOUS BLD VENIPUNCTURE: CPT | Performed by: EMERGENCY MEDICINE

## 2018-09-14 PROCEDURE — 94762 N-INVAS EAR/PLS OXIMTRY CONT: CPT

## 2018-09-14 RX ORDER — ALLOPURINOL 300 MG/1
300 TABLET ORAL DAILY
Status: DISCONTINUED | OUTPATIENT
Start: 2018-09-14 | End: 2018-09-18 | Stop reason: HOSPADM

## 2018-09-14 RX ORDER — ONDANSETRON 2 MG/ML
4 INJECTION INTRAMUSCULAR; INTRAVENOUS ONCE
Status: COMPLETED | OUTPATIENT
Start: 2018-09-14 | End: 2018-09-14

## 2018-09-14 RX ORDER — NALOXONE HYDROCHLORIDE 1 MG/ML
4 INJECTION INTRAMUSCULAR; INTRAVENOUS; SUBCUTANEOUS ONCE
Status: COMPLETED | OUTPATIENT
Start: 2018-09-14 | End: 2018-09-14

## 2018-09-14 RX ORDER — ALBUTEROL SULFATE 90 UG/1
2 AEROSOL, METERED RESPIRATORY (INHALATION) EVERY 4 HOURS PRN
Status: DISCONTINUED | OUTPATIENT
Start: 2018-09-14 | End: 2018-09-18 | Stop reason: HOSPADM

## 2018-09-14 RX ORDER — DICYCLOMINE HCL 20 MG
20 TABLET ORAL ONCE
Status: COMPLETED | OUTPATIENT
Start: 2018-09-14 | End: 2018-09-14

## 2018-09-14 RX ORDER — METHOCARBAMOL 750 MG/1
750 TABLET, FILM COATED ORAL 4 TIMES DAILY
Status: DISCONTINUED | OUTPATIENT
Start: 2018-09-14 | End: 2018-09-18 | Stop reason: HOSPADM

## 2018-09-14 RX ORDER — ALBUTEROL SULFATE 2.5 MG/3ML
2.5 SOLUTION RESPIRATORY (INHALATION) EVERY 6 HOURS PRN
Status: DISCONTINUED | OUTPATIENT
Start: 2018-09-14 | End: 2018-09-18 | Stop reason: HOSPADM

## 2018-09-14 RX ORDER — ONDANSETRON 2 MG/ML
INJECTION INTRAMUSCULAR; INTRAVENOUS
Status: COMPLETED
Start: 2018-09-14 | End: 2018-09-14

## 2018-09-14 RX ORDER — LOSARTAN POTASSIUM 50 MG/1
100 TABLET ORAL DAILY
Status: DISCONTINUED | OUTPATIENT
Start: 2018-09-14 | End: 2018-09-18 | Stop reason: HOSPADM

## 2018-09-14 RX ORDER — ASPIRIN 81 MG/1
324 TABLET, CHEWABLE ORAL ONCE
Status: COMPLETED | OUTPATIENT
Start: 2018-09-14 | End: 2018-09-14

## 2018-09-14 RX ORDER — SODIUM CHLORIDE 9 MG/ML
125 INJECTION, SOLUTION INTRAVENOUS CONTINUOUS
Status: DISCONTINUED | OUTPATIENT
Start: 2018-09-14 | End: 2018-09-16

## 2018-09-14 RX ORDER — NICOTINE 21 MG/24HR
1 PATCH, TRANSDERMAL 24 HOURS TRANSDERMAL DAILY
Status: DISCONTINUED | OUTPATIENT
Start: 2018-09-14 | End: 2018-09-18 | Stop reason: HOSPADM

## 2018-09-14 RX ORDER — ACETAMINOPHEN 325 MG/1
650 TABLET ORAL EVERY 6 HOURS PRN
Status: DISCONTINUED | OUTPATIENT
Start: 2018-09-14 | End: 2018-09-18 | Stop reason: HOSPADM

## 2018-09-14 RX ADMIN — IOHEXOL 100 ML: 350 INJECTION, SOLUTION INTRAVENOUS at 05:42

## 2018-09-14 RX ADMIN — METHOCARBAMOL 750 MG: 750 TABLET ORAL at 21:48

## 2018-09-14 RX ADMIN — ASPIRIN 81 MG 324 MG: 81 TABLET ORAL at 01:13

## 2018-09-14 RX ADMIN — NALOXONE HYDROCHLORIDE 0.4 MG/HR: 1 INJECTION PARENTERAL at 15:22

## 2018-09-14 RX ADMIN — ENOXAPARIN SODIUM 40 MG: 40 INJECTION SUBCUTANEOUS at 09:23

## 2018-09-14 RX ADMIN — METHOCARBAMOL 750 MG: 750 TABLET ORAL at 17:32

## 2018-09-14 RX ADMIN — ONDANSETRON 4 MG: 2 INJECTION INTRAMUSCULAR; INTRAVENOUS at 00:07

## 2018-09-14 RX ADMIN — ALLOPURINOL 300 MG: 300 TABLET ORAL at 10:38

## 2018-09-14 RX ADMIN — LOSARTAN POTASSIUM 100 MG: 50 TABLET ORAL at 09:21

## 2018-09-14 RX ADMIN — NALOXONE HYDROCHLORIDE 4 MG: 1 INJECTION PARENTERAL at 15:08

## 2018-09-14 RX ADMIN — SODIUM CHLORIDE 1000 ML: 0.9 INJECTION, SOLUTION INTRAVENOUS at 00:15

## 2018-09-14 RX ADMIN — METHOCARBAMOL 750 MG: 750 TABLET ORAL at 11:47

## 2018-09-14 RX ADMIN — DICYCLOMINE HYDROCHLORIDE 20 MG: 20 TABLET ORAL at 05:22

## 2018-09-14 RX ADMIN — METHOCARBAMOL 750 MG: 750 TABLET ORAL at 09:21

## 2018-09-14 RX ADMIN — SODIUM CHLORIDE 1000 ML: 0.9 INJECTION, SOLUTION INTRAVENOUS at 01:51

## 2018-09-14 RX ADMIN — SODIUM CHLORIDE 125 ML/HR: 0.9 INJECTION, SOLUTION INTRAVENOUS at 09:22

## 2018-09-14 NOTE — CASE MANAGEMENT
Thank you,  [address]  Utilization Review Department  Phone: [phone]; Fax [phone]  ATTENTION: Please call with any questions or concerns to [phone]  and carefully follow the prompts so that you are directed to the right person  Send all requests for admission clinical reviews, approved or denied determinations and any other requests to fax [phone]  All voicemails are confidential      Initial Clinical Review    Admission: Date/Time/Statement: OBSERVATION 9/14/18 @0645    Orders Placed This Encounter   Procedures    Place in Observation (expected length of stay for this patient is less than two midnights)     Standing Status:   Standing     Number of Occurrences:   1     Order Specific Question:   Admitting Physician     Answer:   Cece Dominique [[id number]]     Order Specific Question:   Level of Care     Answer:   Med Surg [16]     Order Specific Question:   Bed request comments     Answer:   tele       ED Arrival Information     Expected Arrival Acuity Means of Arrival Escorted By Service Admission Type    - 9/13/2018 23:38 [facility] [address] Emergency    Arrival Complaint    ABDOMINAL PAIN        Chief Complaint   Patient presents with    Overdose - Accidental     Pt brought in by EMS for accidental oversdose  Pt admitted to taking liquid morphine given to him by a " friend" Pt was found unresposnive by spouse  EMS was alerted at the scene  Pt received nasal narcan x2  Pt currently is AAOx4  History of Illness: 58 yo m to ED by EMS after accidentally drinking 135mg liquid morphine  His friend gave this to him to control his pain  Has chronic back, leg, abdominal pain  Dx pneumonia 10 days ago, on antbx  Doesn't usually use narcotics, this is a 1 time event  Went to bathroom to put cold water on his face and he collapsed  2 doses intranasal narcan given by EMS  Vomiting upon arrival to ED  Sat dropped to 88% while sleeping   3li o2 applied  +BLE edema, +back tenderness, +L abd tenderness  Actively vomiting    ED Vital Signs:   ED Triage Vitals   Temperature Pulse Respirations Blood Pressure SpO2   09/13/18 2344 09/13/18 2344 09/13/18 2344 09/13/18 2344 09/13/18 2344   97 9 °F (36 6 °C) 101 19 135/81 95 %      Temp Source Heart Rate Source Patient Position - Orthostatic VS BP Location FiO2 (%)   09/13/18 2344 09/13/18 2344 09/14/18 0819 09/14/18 0819 --   Oral Monitor Sitting Right arm       Pain Score       09/13/18 2344       8        Wt Readings from Last 1 Encounters:   09/13/18 100 kg (220 lb 7 4 oz)       Vital Signs (abnormal):  x 1    RR 23, 23, 30       Abnormal Labs/Diagnostic Test Results:   Cl 99   Creat 1 33   Gluc 144   Sgot 46   Alb 3 2   t bili   1   Trop  14,  22     Wbc 17 3   UA negative  EKg: nsr  CT a&p Urinary bladder is moderately distended with bladder volume estimated at 900 mL     Suspected undescended right testis located within the right inguinal canal (axial image 88)     CXR: nothing acute    ED Treatment:   Medication Administration from 09/13/2018 2338 to 09/14/2018 1351       Date/Time Order Dose Route Action Action by Comments     09/14/2018 0007 ondansetron (ZOFRAN) injection 4 mg 4 mg Intravenous Given Zo Vega RN      09/14/2018 0015 sodium chloride 0 9 % bolus 1,000 mL 1,000 mL Intravenous New Bag Zo Vega RN      09/14/2018 0151 sodium chloride 0 9 % bolus 1,000 mL 1,000 mL Intravenous New Bag Zo Vega RN      09/14/2018 0113 aspirin chewable tablet 324 mg 324 mg Oral Given Zo Vega RN      09/14/2018 0922 sodium chloride 0 9 % infusion 125 mL/hr Intravenous [address] Ramiro Geronimo RN      09/14/2018 0923 enoxaparin (LOVENOX) subcutaneous injection 40 mg 40 mg Subcutaneous Given Ramiro Geronimo RN           Past Medical/Surgical History:    Active Ambulatory Problems     Diagnosis Date Noted    Chronic bronchitis ([facility] [address] ) 01/19/2018    SOB (shortness of breath) 01/19/2018    Abnormal chest CT 01/19/2018    Tobacco abuse 02/16/2018    Benign essential hypertension 07/16/2018    Chronic back pain 07/16/2018    Chronic low back pain 07/16/2018    Chronic obstructive lung disease ([facility] [address] ) 07/16/2018    Exanthem due to herpes zoster 07/16/2018    Gout 07/16/2018    Heart block 07/16/2018    Hypertriglyceridemia 07/16/2018    Hypothyroidism 07/16/2018    Leukocytosis 07/16/2018    Lumbar radiculopathy 07/16/2018    Mixed hyperlipidemia 07/16/2018    Nicotine dependence 07/16/2018    Peripheral arterial occlusive disease ([facility] [address] ) 07/16/2018    Simple obesity 07/16/2018    Tachycardia 07/16/2018    [facility]    Diverticulosis of large intestine without hemorrhage 07/16/2018     Resolved Ambulatory Problems     Diagnosis Date Noted    No Resolved Ambulatory Problems     Past Medical History:   Diagnosis Date    COPD (chronic obstructive pulmonary disease) (HCC)     Emphysema lung (HCC)     Gout     Hypertension        Admitting Diagnosis: Overdose [T50 901A]    Age/Sex: 57 y o  male    Assessment/Plan: 56 yo m to ED by EMS admitted due to accidental overdose of morphine  Required 2 doses intranasal narcan       Admission Orders:  Scheduled Meds:   Current Facility-Administered Medications:  acetaminophen 650 mg Oral Q6H PRN   albuterol 2 puff Inhalation Q4H PRN   albuterol 2 5 mg Nebulization Q6H PRN   allopurinol 300 mg Oral Daily   enoxaparin 40 mg Subcutaneous Daily   losartan 100 mg Oral Daily   methocarbamol 750 mg Oral 4x Daily   nicotine 1 patch Transdermal Daily   sodium chloride 125 mL/hr Intravenous Continuous     Bmp, cbc in am  cardiac 2 3gm na diet  Up w/assist  3-4 liters o2 via NC  Tele

## 2018-09-14 NOTE — ED PROVIDER NOTES
History  Chief Complaint   Patient presents with    Overdose - Accidental     Pt brought in by EMS for accidental oversdose  Pt admitted to taking liquid morphine given to him by a " friend" Pt was found unresposnive by spouse  EMS was alerted at the scene  Pt received nasal narcan x2  Pt currently is AAOx3      51-year-old male presents for accidental overdose  Patient states that he was drinking liquid morphine, states that he drank 135 mg of liquid morphine  His friend gave him liquid morphine to help control the pain he was having  Patient is naive to liquid morphine and was not intending to overdose  He was attempting to control his pain  Patient has chronic pain in his back, legs, abdomen  He was diagnosed with pneumonia 10 days ago and has been on antibiotics for this  Patient states that he does not use morphine or other narcotic pain medications to control his pain  He states that this was a 1 time thing  He remembers going to the bathroom to put cold water on his face, then he collapsed and EMS was called  They gave him 2 doses of intranasal Narcan  On arrival to the emergency department he is alert and oriented, vomiting from the Narcan  Prior to Admission Medications   Prescriptions Last Dose Informant Patient Reported? Taking?    albuterol (2 5 mg/3 mL) 0 083 % nebulizer solution   No No   Sig: Take 1 vial (2 5 mg total) by nebulization every 6 (six) hours as needed for shortness of breath   albuterol (PROVENTIL HFA,VENTOLIN HFA) 90 mcg/act inhaler   No No   Sig: Inhale 2 puffs every 4 (four) hours as needed for wheezing   albuterol (VENTOLIN HFA) 90 mcg/act inhaler   No No   Sig: Inhale 2 puffs every 4 (four) hours as needed for wheezing   allopurinol (ZYLOPRIM) 300 mg tablet   No No   Sig: Take 1 tablet (300 mg total) by mouth daily   indomethacin (INDOCIN) 50 mg capsule   No No   Sig: Take 1 capsule (50 mg total) by mouth 3 (three) times a day with meals   levofloxacin (LEVAQUIN) 500 mg tablet   No No   Sig: Take 1 tablet (500 mg total) by mouth daily for 10 days   losartan (COZAAR) 100 MG tablet   No No   Sig: Take 1 tablet (100 mg total) by mouth daily   methocarbamol (ROBAXIN) 750 mg tablet   Yes No   Sig: Take 750 mg by mouth 4 (four) times a day   naproxen (NAPROSYN) 500 mg tablet   No No   Sig: Take 1 tablet (500 mg total) by mouth 2 (two) times a day with meals for 10 days      Facility-Administered Medications: None       Past Medical History:   Diagnosis Date    COPD (chronic obstructive pulmonary disease) (HCC)     Emphysema lung (HCC)     Gout     Hypertension        Past Surgical History:   Procedure Laterality Date    APPENDECTOMY      BACK SURGERY      JOINT REPLACEMENT Right     knee    REPLACEMENT TOTAL KNEE Right        Family History   Problem Relation Age of Onset    Coronary artery disease Mother     Hypertension Mother     Diabetes Mother     Asthma Mother     COPD Mother     Thrombosis Mother    [place] Arthritis Mother     Hyperlipidemia Sister     Thyroid disease Sister     Breast cancer Sister      I have reviewed and agree with the history as documented  Social History   Substance Use Topics    Smoking status: Current Every Day Smoker     Packs/day: 1 00     Types: Cigarettes    Smokeless tobacco: Never Used    Alcohol use Yes      Comment: occasional        Review of Systems   Constitutional: Negative for chills, fatigue and fever  Respiratory: Positive for apnea  Negative for cough, chest tightness and shortness of breath  Cardiovascular: Negative for chest pain and palpitations  Gastrointestinal: Positive for abdominal pain ( worked up last week), nausea and vomiting  Negative for constipation and diarrhea  Genitourinary: Negative for dysuria and flank pain  Musculoskeletal: Positive for back pain (Chronic)  Negative for neck pain  Skin: Negative for color change and rash     Allergic/Immunologic: Negative for immunocompromised state  Neurological: Negative for dizziness, syncope and headaches  Physical Exam  Physical Exam   Constitutional: He is oriented to person, place, and time  He appears well-developed and well-nourished  No distress  HENT:   Head: Normocephalic and atraumatic  Mouth/Throat: Oropharynx is clear and moist    Eyes: Conjunctivae and EOM are normal  Pupils are equal, round, and reactive to light  Right eye exhibits no discharge  Left eye exhibits no discharge  No scleral icterus  Neck: Normal range of motion  Neck supple  Cardiovascular: Normal rate, regular rhythm, normal heart sounds and intact distal pulses  Exam reveals no gallop and no friction rub  No murmur heard  Pulmonary/Chest: Effort normal and breath sounds normal  No respiratory distress  He has no wheezes  He has no rales  He exhibits no tenderness  Abdominal: Soft  Bowel sounds are normal  He exhibits no distension and no mass  There is tenderness (Left-sided)  There is no rebound and no guarding  No hernia  Actively vomiting, relieved after Zofran   Musculoskeletal: Normal range of motion  He exhibits edema ( bilateral feet) and tenderness ( back, chronic)  He exhibits no deformity  Neurological: He is alert and oriented to person, place, and time  No cranial nerve deficit  Skin: Skin is warm and dry  No rash noted  He is not diaphoretic  No erythema  No pallor  Psychiatric: He has a normal mood and affect  His behavior is normal    Vitals reviewed        Vital Signs  ED Triage Vitals [09/13/18 2344]   Temperature Pulse Respirations Blood Pressure SpO2   97 9 °F (36 6 °C) 101 19 135/81 95 %      Temp Source Heart Rate Source Patient Position - Orthostatic VS BP Location FiO2 (%)   Oral Monitor -- -- --      Pain Score       8           Vitals:    09/14/18 0500 09/14/18 0530 09/14/18 0600 09/14/18 0630   BP: 110/61 119/73 110/75 124/60   Pulse: 83 (!) 107 90 84       Visual Acuity  Visual Acuity      Most Recent Value   L Pupil Size (mm)  2   R Pupil Size (mm)  2          ED Medications  Medications   ondansetron (ZOFRAN) injection 4 mg (4 mg Intravenous Given 9/14/18 0007)   sodium chloride 0 9 % bolus 1,000 mL (0 mL Intravenous Stopped 9/14/18 0121)   sodium chloride 0 9 % bolus 1,000 mL (0 mL Intravenous Stopped 9/14/18 0256)   aspirin chewable tablet 324 mg (324 mg Oral Given 9/14/18 0113)   dicyclomine (BENTYL) tablet 20 mg (20 mg Oral Given 9/14/18 0522)   iohexol (OMNIPAQUE) 350 MG/ML injection (MULTI-DOSE) 100 mL (100 mL Intravenous Given 9/14/18 0542)       Diagnostic Studies  Results Reviewed     Procedure Component Value Units Date/Time    UA w Reflex to Microscopic w Reflex to Culture [[id number]] Collected:  09/14/18 0606    Lab Status:  Final result Specimen:  Urine from Urine, Other Updated:  09/14/18 0618     Color, UA Yellow     Clarity, UA Clear     Specific May, UA 1 020     pH, UA 5 5     Leukocytes, UA Negative     Nitrite, UA Negative     Protein, UA Negative mg/dl      Glucose, UA Negative mg/dl      Ketones, UA Negative mg/dl      Urobilinogen, UA 0 2 E U /dl      Bilirubin, UA Negative     Blood, UA Negative    Troponin I [[id number]]  (Abnormal) Collected:  09/14/18 0421    Lab Status:  Final result Specimen:  Blood from Arm, Left Updated:  09/14/18 0453     Troponin I 0 22 (H) ng/mL     NT-BNP PRO [[id number]]  (Normal) Collected:  09/14/18 0015    Lab Status:  Final result Specimen:  Blood from Arm, Left Updated:  09/14/18 0044     NT-proBNP 103 pg/mL     Comprehensive metabolic panel [[id number]]  (Abnormal) Collected:  09/14/18 0015    Lab Status:  Final result Specimen:  Blood from Arm, Left Updated:  09/14/18 0044     Sodium 137 mmol/L      Potassium 4 2 mmol/L      Chloride 99 (L) mmol/L      CO2 32 mmol/L      ANION GAP 6 mmol/L      BUN 11 mg/dL      Creatinine 1 33 (H) mg/dL      Glucose 144 (H) mg/dL      Calcium 9 1 mg/dL      AST 46 (H) U/L      ALT 53 U/L      Alkaline Phosphatase 80 U/L      Total Protein 7 3 g/dL      Albumin 3 2 (L) g/dL      Total Bilirubin 0 10 (L) mg/dL      eGFR 64 ml/min/1 73sq m     Narrative:         National Kidney Disease Education Program recommendations are as follows:  GFR calculation is accurate only with a steady state creatinine  Chronic Kidney disease less than 60 ml/min/1 73 sq  meters  Kidney failure less than 15 ml/min/1 73 sq  meters  Troponin I [[id number]]  (Abnormal) Collected:  09/14/18 0015    Lab Status:  Final result Specimen:  Blood from Arm, Left Updated:  09/14/18 0042     Troponin I 0 14 (H) ng/mL     CBC and differential [[id number]]  (Abnormal) Collected:  09/14/18 0015    Lab Status:  Final result Specimen:  Blood from Arm, Left Updated:  09/14/18 0022     WBC 17 30 (H) Thousand/uL      RBC 4 43 Million/uL      Hemoglobin 14 4 g/dL      Hematocrit 44 0 %      MCV 99 (H) fL      MCH 32 5 pg      MCHC 32 7 g/dL      RDW 12 9 %      MPV 10 8 fL      Platelets 182 Thousands/uL      nRBC 0 /100 WBCs      Neutrophils Relative 75 %      Immat GRANS % 2 %      Lymphocytes Relative 15 %      Monocytes Relative 6 %      Eosinophils Relative 1 %      Basophils Relative 1 %      Neutrophils Absolute 13 10 (H) Thousands/µL      Immature Grans Absolute 0 30 (H) Thousand/uL      Lymphocytes Absolute 2 55 Thousands/µL      Monocytes Absolute 1 06 Thousand/µL      Eosinophils Absolute 0 20 Thousand/µL      Basophils Absolute 0 09 Thousands/µL                  CT abdomen pelvis with contrast   ED Interpretation by Jayy Kraus DO (09/14 2834)   Urinary bladder is moderately distended with bladder volume estimated at 900 mL  Consider a component of urinary retention if the patient cannot spontaneously void  No acute process seen otherwise        Suspected undescended right testis located within the right inguinal canal (axial image 88)  Correlation with the patient's symptoms recommended        Final Result by Doris Vega DO (09/14 3828) Urinary bladder is moderately distended with bladder volume estimated at 900 mL  Consider a component of urinary retention if the patient cannot spontaneously void  No acute process seen otherwise  Suspected undescended right testis located within the right inguinal canal (axial image 88)  Correlation with the patient's symptoms recommended  Other nonacute findings as above  Workstation performed: [id number]         XR chest 2 views   ED Interpretation by Be Grimm DO (09/14 0102)   Diffuse infiltrates, likely aspiration  Procedures  ECG 12 Lead Documentation  Date/Time: 9/14/2018 4:29 AM  Performed by: Lisbet Mejias by: Veda Calvo     Indications / Diagnosis:  OD - elevated troponin  ECG reviewed by me, the ED Provider: yes    Patient location:  ED  Previous ECG:     Previous ECG:  Compared to current    Comparison ECG info:  Earlier tonight    Similarity:  No change    Comparison to cardiac monitor: Yes    Interpretation:     Interpretation: normal    Rate:     ECG rate:  84    ECG rate assessment: normal    Rhythm:     Rhythm: sinus rhythm    Ectopy:     Ectopy: none    QRS:     QRS axis:  Normal    QRS intervals:  Normal  Conduction:     Conduction: normal    ST segments:     ST segments:  Normal  T waves:     T waves: normal             Phone Contacts  ED Phone Contact    ED Course  ED Course as of Sep 14 0647   Fri Sep 14, 2018   0102 Troponin I: (!) 0 14   0502 Patient agreeable to admission Troponin I: (!) 0 22                               MDM  Number of Diagnoses or Management Options  Elevated troponin:   Morphine overdose:   Overdose of opiate or related narcotic, accidental or unintentional, initial encounter:   Diagnosis management comments: Morphine overdose  Reversed with Narcan  Patient is found have elevated troponin on lab work    Patient will likely require admission to the hospital for evaluation and to ensure that the troponin resolves  Basic workup is performed in regards to his other complaints and no other abnormalities are found  Abdominal pain is evaluated with a CT scan which is negative  Patient has some bladder distension but states that he often is unable to urinate initially when he gets to the hospital   This will be further observed  Patient is agreeable to admission for observation of elevated troponin  Likely secondary to demand ischemia  No one-to-one is needed as this was an unintentional, accidental overdose  Amount and/or Complexity of Data Reviewed  Clinical lab tests: reviewed and ordered  Tests in the radiology section of CPT®: reviewed and ordered      CritCare Time    Disposition  Final diagnoses:   Morphine overdose   Overdose of opiate or related narcotic, accidental or unintentional, initial encounter   Elevated troponin     Time reflects when diagnosis was documented in both MDM as applicable and the Disposition within this note     Time User Action Codes Description Comment    9/14/2018  6:42 AM Gisella Hirsch Add [T40 2X1A] Morphine overdose     9/14/2018  6:44 AM Coppersmstefan, Charl Pradeep L Add [T40 601A] Overdose of opiate or related narcotic, accidental or unintentional, initial encounter     9/14/2018  6:44 AM Joycelyn, Charl Pradeep L Add [R74 8] Elevated troponin       ED Disposition     ED Disposition Condition Comment    Admit  Case was discussed with Kg (SLIM AP) and the patient's admission status was agreed to be Admission Status: observation status to the service of Dr Nils Chopra ([facility])   Follow-up Information    None         Patient's Medications   Discharge Prescriptions    No medications on file     No discharge procedures on file      ED Provider  Electronically Signed by           Kd Guillen DO  09/14/18 0556

## 2018-09-15 LAB
ANION GAP SERPL CALCULATED.3IONS-SCNC: 2 MMOL/L (ref 4–13)
BUN SERPL-MCNC: 12 MG/DL (ref 5–25)
CALCIUM SERPL-MCNC: 8.6 MG/DL (ref 8.3–10.1)
CHLORIDE SERPL-SCNC: 102 MMOL/L (ref 100–108)
CO2 SERPL-SCNC: 36 MMOL/L (ref 21–32)
CREAT SERPL-MCNC: 1.09 MG/DL (ref 0.6–1.3)
ERYTHROCYTE [DISTWIDTH] IN BLOOD BY AUTOMATED COUNT: 13.1 % (ref 11.6–15.1)
GFR SERPL CREATININE-BSD FRML MDRD: 81 ML/MIN/1.73SQ M
GLUCOSE SERPL-MCNC: 87 MG/DL (ref 65–140)
HCT VFR BLD AUTO: 41.5 % (ref 36.5–49.3)
HGB BLD-MCNC: 12.9 G/DL (ref 12–17)
MCH RBC QN AUTO: 31.8 PG (ref 26.8–34.3)
MCHC RBC AUTO-ENTMCNC: 31.1 G/DL (ref 31.4–37.4)
MCV RBC AUTO: 102 FL (ref 82–98)
PLATELET # BLD AUTO: 343 THOUSANDS/UL (ref 149–390)
PMV BLD AUTO: 10.7 FL (ref 8.9–12.7)
POTASSIUM SERPL-SCNC: 4.5 MMOL/L (ref 3.5–5.3)
RBC # BLD AUTO: 4.06 MILLION/UL (ref 3.88–5.62)
SODIUM SERPL-SCNC: 140 MMOL/L (ref 136–145)
WBC # BLD AUTO: 12.05 THOUSAND/UL (ref 4.31–10.16)

## 2018-09-15 PROCEDURE — 99233 SBSQ HOSP IP/OBS HIGH 50: CPT | Performed by: INTERNAL MEDICINE

## 2018-09-15 PROCEDURE — 85027 COMPLETE CBC AUTOMATED: CPT | Performed by: INTERNAL MEDICINE

## 2018-09-15 PROCEDURE — 99225 PR SBSQ OBSERVATION CARE/DAY 25 MINUTES: CPT | Performed by: INTERNAL MEDICINE

## 2018-09-15 PROCEDURE — 94762 N-INVAS EAR/PLS OXIMTRY CONT: CPT

## 2018-09-15 PROCEDURE — 80048 BASIC METABOLIC PNL TOTAL CA: CPT | Performed by: INTERNAL MEDICINE

## 2018-09-15 RX ORDER — ONDANSETRON 2 MG/ML
4 INJECTION INTRAMUSCULAR; INTRAVENOUS EVERY 6 HOURS PRN
Status: DISCONTINUED | OUTPATIENT
Start: 2018-09-15 | End: 2018-09-16

## 2018-09-15 RX ADMIN — ONDANSETRON 4 MG: 2 INJECTION INTRAMUSCULAR; INTRAVENOUS at 17:49

## 2018-09-15 RX ADMIN — METHOCARBAMOL 750 MG: 750 TABLET ORAL at 21:29

## 2018-09-15 RX ADMIN — METHOCARBAMOL 750 MG: 750 TABLET ORAL at 13:50

## 2018-09-15 RX ADMIN — LOSARTAN POTASSIUM 100 MG: 50 TABLET ORAL at 08:06

## 2018-09-15 RX ADMIN — METHOCARBAMOL 750 MG: 750 TABLET ORAL at 17:49

## 2018-09-15 RX ADMIN — ALLOPURINOL 300 MG: 300 TABLET ORAL at 08:05

## 2018-09-15 RX ADMIN — SODIUM CHLORIDE 125 ML/HR: 0.9 INJECTION, SOLUTION INTRAVENOUS at 06:50

## 2018-09-15 RX ADMIN — SODIUM CHLORIDE 125 ML/HR: 0.9 INJECTION, SOLUTION INTRAVENOUS at 17:54

## 2018-09-15 RX ADMIN — METHOCARBAMOL 750 MG: 750 TABLET ORAL at 08:06

## 2018-09-15 RX ADMIN — ENOXAPARIN SODIUM 40 MG: 40 INJECTION SUBCUTANEOUS at 08:06

## 2018-09-15 NOTE — PLAN OF CARE
PAIN - ADULT     Verbalizes/displays adequate comfort level or baseline comfort level Progressing        Potential for Falls     Patient will remain free of falls Progressing        RESPIRATORY - ADULT     Achieves optimal ventilation and oxygenation Progressing        SAFETY ADULT     Maintain or return to baseline ADL function Progressing     Maintain or return mobility status to optimal level Progressing

## 2018-09-15 NOTE — PROGRESS NOTES
Pt admits to alcohol usage, SLIM notified  CIWA not ordered at this time, was instructed to monitor pt for signs of withdrawal  Pt is calm and cooperative, no tremors

## 2018-09-15 NOTE — H&P
History and Physical - [facility] Internal Medicine    Patient Information: Brittnee Souza 57 y o  male MRN: [id number]  Unit/Bed#: -01 Encounter: [id number]  Admitting Physician: Pranav Espino MD  PCP: Cristela Bazan DO  Date of Admission:   9/14/18    Assessment/Plan:    Hospital Problem List:     Principal Problem:    Overdose  Active Problems:    Benign essential hypertension    Chronic low back pain    Chronic obstructive lung disease ([address] )    Gout      Plan for the Primary Problem(s):  · 1  Drug overdose- patient took 135mg of his friends morphine- Given narcan in the field  Will continue supportive care  On IVF  · 2  Chronic back pain  · 3  COPD- not in acute exacerbation  · 4  HTN- on cozaar  · 5  Gout- on allopurinol  · 6  Leukocytosis- no sings of infection  Will continue to monitor  · 7  NSTEMI- possibly type 2 secondary to demand ischemia  Will continue to monitor  ·     Plan for Additional Problems:   ·     VTE Prophylaxis: Enoxaparin (Lovenox)  / sequential compression device   Code Status: Full  POLST: There is no POLST form on file for this patient (pre-hospital)    Anticipated Length of Stay:  Patient will be admitted on an Observation basis with an anticipated length of stay of  less 2 midnights  Justification for Hospital Stay: drug overdose    Total Time for Visit, including Counseling / Coordination of Care: 30 minutes  Greater than 50% of this total time spent on direct patient counseling and coordination of care  Chief Complaint:   Drug overdose    History of Present Illness:    Brittnee Souza is a 57 y o  male who presents with pmhx of chronic back pain, gout, HTN was brought in by EMS because patient was unresponsive from taking 135mg of his friends morphine  Patient stated that he took it to control his back pain  Patient denies trying to harm himself or others  Patient was given narcan twice in the field and is now alert and coherent    Patient will be admitted for further evaluation  Patient denies any cough, fever or chills  No chest pain, palpitations or diaphoresis  Review of Systems:    Review of Systems   HENT: Negative  Respiratory: Negative  Cardiovascular: Negative  Gastrointestinal: Negative  Genitourinary: Negative  Neurological: Negative  Past Medical and Surgical History:     Past Medical History:   Diagnosis Date    [facility]) [address] [address]     [facility]     Gout     Hypertension        Past Surgical History:   Procedure Laterality Date    APPENDECTOMY      BACK SURGERY      JOINT REPLACEMENT Right     knee    REPLACEMENT TOTAL KNEE Right        Meds/Allergies:    Prior to Admission medications    Medication Sig Start Date End Date Taking?  Authorizing Provider   albuterol (2 5 mg/3 mL) 0 083 % nebulizer solution Take 1 vial (2 5 mg total) by nebulization every 6 (six) hours as needed for shortness of breath 9/7/18   Renaee Furbish, DO   albuterol (PROVENTIL HFA,VENTOLIN HFA) 90 mcg/act inhaler Inhale 2 puffs every 4 (four) hours as needed for wheezing 9/7/18   Renaee Furbish, DO   albuterol (VENTOLIN HFA) 90 mcg/act inhaler Inhale 2 puffs every 4 (four) hours as needed for wheezing 9/7/18   Renaee Furbish, DO   allopurinol (ZYLOPRIM) 300 mg tablet Take 1 tablet (300 mg total) by mouth daily 7/16/18   ABIGAIL Zurita   indomethacin (INDOCIN) 50 mg capsule Take 1 capsule (50 mg total) by mouth 3 (three) times a day with meals 7/16/18   ABIGAIL Zurita   levofloxacin (LEVAQUIN) 500 mg tablet Take 1 tablet (500 mg total) by mouth daily for 10 days 9/7/18 9/17/18  Salude Bulmarosh, DO   losartan (COZAAR) 100 MG tablet Take 1 tablet (100 mg total) by mouth daily 7/16/18   ABIGAIL Zurita   methocarbamol (ROBAXIN) 750 mg tablet Take 750 mg by mouth 4 (four) times a day    Historical Provider, MD   naproxen (NAPROSYN) 500 mg tablet Take 1 tablet (500 mg total) by mouth 2 (two) times a day with meals for 10 days 8/19/18 8/29/18  Clau Banegas MD     I have reviewed home medications with patient personally  Allergies: No Known Allergies    Social History:     Marital Status: /Civil Union   Occupation:   Patient Pre-hospital Living Situation: home  Patient Pre-hospital Level of Mobility: walks  Patient Pre-hospital Diet Restrictions: cardiac  Substance Use History:   History   Alcohol Use    Yes     Comment: occasional     History   Smoking Status    Current Every Day Smoker    Packs/day: 1 00    Types: Cigarettes   Smokeless Tobacco    Never Used     History   Drug Use No       Family History:    non-contributory    Physical Exam:     Vitals:   Blood Pressure: 129/66 (09/15/18 1100)  Pulse: 81 (09/15/18 1100)  Temperature: 97 8 °F (36 6 °C) (09/15/18 1100)  Temp Source: Oral (09/15/18 1100)  Respirations: 18 (09/15/18 1100)  Height: 5' 6" (167 6 cm) (09/14/18 1929)  Weight - Scale: 100 kg (220 lb 7 4 oz) (09/14/18 1929)  SpO2: 95 % (09/15/18 1100)    Physical Exam   Constitutional: He is oriented to person, place, and time  He appears well-developed and well-nourished  Unkept   HENT:   Head: Normocephalic and atraumatic  Eyes: Conjunctivae and EOM are normal  Pupils are equal, round, and reactive to light  Neck: Normal range of motion  Neck supple  No JVD present  No tracheal deviation present  No thyromegaly present  Cardiovascular: Normal rate, regular rhythm and normal heart sounds  Exam reveals no gallop and no friction rub  No murmur heard  Pulmonary/Chest: Effort normal and breath sounds normal  No respiratory distress  He has no wheezes  He has no rales  Abdominal: Soft  Bowel sounds are normal  He exhibits no distension  There is no tenderness  There is no rebound  Musculoskeletal: Normal range of motion  He exhibits no edema  Neurological: He is alert and oriented to person, place, and time  Vitals reviewed            Additional Data:     Lab Results: I have personally reviewed pertinent reports  Results from last 7 days  Lab Units 09/15/18  0523 09/14/18  0015   WBC Thousand/uL 12 05* 17 30*   HEMOGLOBIN g/dL 12 9 14 4   HEMATOCRIT % 41 5 44 0   PLATELETS Thousands/uL 343 382   NEUTROS PCT %  --  75   LYMPHS PCT %  --  15   MONOS PCT %  --  6   EOS PCT %  --  1       Results from last 7 days  Lab Units 09/15/18  0523 09/14/18  0015   SODIUM mmol/L 140 137   POTASSIUM mmol/L 4 5 4 2   CHLORIDE mmol/L 102 99*   CO2 mmol/L 36* 32   BUN mg/dL 12 11   CREATININE mg/dL 1 09 1 33*   CALCIUM mg/dL 8 6 9 1   ALK PHOS U/L  --  80   ALT U/L  --  53   AST U/L  --  46*           Imaging: I have personally reviewed pertinent reports  Xr Chest 1 View Portable    Result Date: 9/7/2018  Narrative: CHEST INDICATION:   looking for pneumothorax  COMPARISON:  11/18/2017 EXAM PERFORMED/VIEWS:  XR CHEST PORTABLE FINDINGS: Cardiomediastinal silhouette appears unremarkable  Small left basilar infiltrate/atelectasis  No pneumothorax or pleural effusion  Osseous structures appear within normal limits for patient age  Impression: Small left basilar infiltrate/atelectasis  No pneumothorax  Workstation performed: [id number]     Xr Chest 2 Views    Result Date: 9/14/2018  Narrative: CHEST INDICATION:   syncope  COMPARISON:  September 7, 2018 EXAM PERFORMED/VIEWS:  XR CHEST PA & LATERAL FINDINGS: Cardiomediastinal silhouette appears unremarkable  Large left lower lobe bulla  Lungs and pleural spaces otherwise clear  No pneumothorax  Osseous structures appear within normal limits for patient age  Impression: No acute abnormality in the chest  Workstation performed: [id number]     Ct Pe Study W Abdomen Pelvis W Contrast    Result Date: 9/7/2018  Narrative: CT PULMONARY ANGIOGRAM OF THE CHEST AND CT ABDOMEN AND PELVIS WITH INTRAVENOUS CONTRAST INDICATION:   dyspnea, back pain    "Abdominal Pain (Pt c/o abdominal pain, left sided back pain and swelling with sharp shooting right sided rib pain and SOB that started today  Pt states he thought he injured his back after lifting shingles 3 days ago but symptoms are worsening  Pt denies N/V/D)""57year-old male patient presents emergency department for evaluation of abdominal and chest pain  The patient's discomfort is more the left side of the lower chest, left flank, left lower quadrant of the abdomen, left upper quadrant of the abdomen  The patient states this is been ongoing for several weeks   " COMPARISON:  CT renal stone study 7/3/2018  CT chest 2/9/2018  TECHNIQUE:  CT examination of the chest, abdomen and pelvis was performed  Thin section CT angiographic technique was used in the chest in order to evaluate for pulmonary embolus and coronal 3D MIP postprocessing was performed on the acquisition scanner  Axial, sagittal, and coronal 2D reformatted images were created from the source data and submitted for interpretation  Radiation dose length product (DLP) for this visit:  1115 75 mGy-cm   This examination, like all CT scans performed in the [facility], was performed utilizing techniques to minimize radiation dose exposure, including the use of iterative reconstruction and automated exposure control  IV Contrast:  100 mL of iohexol (OMNIPAQUE) Enteric Contrast:  Enteric contrast was not administered  FINDINGS: CHEST PULMONARY ARTERIAL TREE:  No pulmonary embolus is seen  LUNGS:  Stable emphysematous changes  Stable large left lung base bulla  Previously seen 4 mm right upper lobe nodule is no longer present  5 mm right upper lobe nodule has also resolved  No endotracheal or endobronchial lesion  Small amount of central and bibasilar bronchial wall thickening as well as a small amount of right mainstem bronchus mucous in keeping with a nonspecific bronchitis  PLEURA:  Unremarkable  HEART/AORTA:  Unremarkable for patient's age  MEDIASTINUM AND SIMON:  Unremarkable  CHEST WALL AND LOWER NECK:   Unremarkable   ABDOMEN LIVER/BILIARY TREE:  Unremarkable  GALLBLADDER:  No calcified gallstones  No pericholecystic inflammatory change  SPLEEN:  Unremarkable  PANCREAS:  Unremarkable  ADRENAL GLANDS:  Unremarkable  KIDNEYS/URETERS:  Unremarkable  No hydronephrosis  STOMACH AND BOWEL:  Unremarkable  APPENDIX:  Appendectomy  ABDOMINOPELVIC CAVITY:  No ascites or free intraperitoneal air  No lymphadenopathy  VESSELS:  Unremarkable for patient's age  PELVIS REPRODUCTIVE ORGANS:  Unremarkable for patient's age  URINARY BLADDER:  Unremarkable  ABDOMINAL WALL/INGUINAL REGIONS:  Unremarkable  OSSEOUS STRUCTURES:  No acute fracture or destructive osseous lesion  Impression: Mild central and bibasilar bronchial wall thickening in keeping with a nonspecific bronchitis  No pulmonary arterial embolism or focal pulmonary consolidation  No acute inflammatory findings in the abdomen or pelvis  The study was marked in EPIC for significant notification  Workstation performed: [id number]     Ct Abdomen Pelvis With Contrast    Result Date: 9/14/2018  Narrative: CT ABDOMEN AND PELVIS WITH IV CONTRAST INDICATION:   LLQ pain, suspect diverticulitis  COMPARISON:  CT abdomen and pelvis dated 9/7/2018 TECHNIQUE:  CT examination of the abdomen and pelvis was performed  Axial, sagittal, and coronal 2D reformatted images were created from the source data and submitted for interpretation  Radiation dose length product (DLP) for this visit:  765 65 mGy-cm   This examination, like all CT scans performed in the [facility], was performed utilizing techniques to minimize radiation dose exposure, including the use of iterative  reconstruction and automated exposure control  IV Contrast:  100 mL of iohexol (OMNIPAQUE) Enteric Contrast:  Enteric contrast was not administered  FINDINGS: ABDOMEN LOWER CHEST:  Large bulla in the left lower lobe is redemonstrated    Some suspected mild atelectasis is noted in the bilateral lung bases, right greater than left  LIVER/BILIARY TREE:  Unremarkable  GALLBLADDER:  No calcified gallstones  No pericholecystic inflammatory change  SPLEEN:  Unremarkable  PANCREAS:  Unremarkable  ADRENAL GLANDS:  Unremarkable  KIDNEYS/URETERS:  Unremarkable  No hydronephrosis  STOMACH AND BOWEL:  Grossly unremarkable APPENDIX:  There are expected postoperative changes of appendectomy  ABDOMINOPELVIC CAVITY:  No ascites or free intraperitoneal air  No lymphadenopathy  VESSELS:  Mild atherosclerosis; no aortic aneurysm PELVIS REPRODUCTIVE ORGANS:  Suspected undescended right testis located within the right inguinal canal (axial image 88)  URINARY BLADDER:  Urinary bladder is moderately distended with bladder volume estimated at 900 mL; otherwise unremarkable  ABDOMINAL WALL/INGUINAL REGIONS:  Unremarkable  OSSEOUS STRUCTURES:  No acute fracture or destructive osseous lesion  Impression: Urinary bladder is moderately distended with bladder volume estimated at 900 mL  Consider a component of urinary retention if the patient cannot spontaneously void  No acute process seen otherwise  Suspected undescended right testis located within the right inguinal canal (axial image 88)  Correlation with the patient's symptoms recommended  Other nonacute findings as above  Workstation performed: [id number]       EKG, Pathology, and Other Studies Reviewed on Admission:   · EKG:     Allscripts / Epic Records Reviewed: Yes     ** Please Note: This note has been constructed using a voice recognition system   **

## 2018-09-16 PROBLEM — R77.8 TROPONIN LEVEL ELEVATED: Status: ACTIVE | Noted: 2018-09-16

## 2018-09-16 LAB
ATRIAL RATE: 73 BPM
ERYTHROCYTE [DISTWIDTH] IN BLOOD BY AUTOMATED COUNT: 12.2 % (ref 11.6–15.1)
HCT VFR BLD AUTO: 42.1 % (ref 36.5–49.3)
HGB BLD-MCNC: 13.6 G/DL (ref 12–17)
MCH RBC QN AUTO: 32.3 PG (ref 26.8–34.3)
MCHC RBC AUTO-ENTMCNC: 32.3 G/DL (ref 31.4–37.4)
MCV RBC AUTO: 100 FL (ref 82–98)
P AXIS: 73 DEGREES
PLATELET # BLD AUTO: 368 THOUSANDS/UL (ref 149–390)
PMV BLD AUTO: 10.3 FL (ref 8.9–12.7)
PR INTERVAL: 132 MS
QRS AXIS: 81 DEGREES
QRSD INTERVAL: 100 MS
QT INTERVAL: 398 MS
QTC INTERVAL: 438 MS
RBC # BLD AUTO: 4.21 MILLION/UL (ref 3.88–5.62)
T WAVE AXIS: 63 DEGREES
TROPONIN I SERPL-MCNC: 0.02 NG/ML
TSH SERPL DL<=0.05 MIU/L-ACNC: 1.15 UIU/ML (ref 0.36–3.74)
VENTRICULAR RATE: 73 BPM
WBC # BLD AUTO: 11.76 THOUSAND/UL (ref 4.31–10.16)

## 2018-09-16 PROCEDURE — 99232 SBSQ HOSP IP/OBS MODERATE 35: CPT | Performed by: INTERNAL MEDICINE

## 2018-09-16 PROCEDURE — 93010 ELECTROCARDIOGRAM REPORT: CPT | Performed by: INTERNAL MEDICINE

## 2018-09-16 PROCEDURE — 84484 ASSAY OF TROPONIN QUANT: CPT | Performed by: INTERNAL MEDICINE

## 2018-09-16 PROCEDURE — 85027 COMPLETE CBC AUTOMATED: CPT | Performed by: INTERNAL MEDICINE

## 2018-09-16 PROCEDURE — 99253 IP/OBS CNSLTJ NEW/EST LOW 45: CPT | Performed by: INTERNAL MEDICINE

## 2018-09-16 PROCEDURE — 93005 ELECTROCARDIOGRAM TRACING: CPT

## 2018-09-16 PROCEDURE — 84443 ASSAY THYROID STIM HORMONE: CPT | Performed by: INTERNAL MEDICINE

## 2018-09-16 RX ORDER — PROMETHAZINE HYDROCHLORIDE 25 MG/ML
12.5 INJECTION, SOLUTION INTRAMUSCULAR; INTRAVENOUS EVERY 6 HOURS PRN
Status: DISCONTINUED | OUTPATIENT
Start: 2018-09-16 | End: 2018-09-18 | Stop reason: HOSPADM

## 2018-09-16 RX ORDER — ONDANSETRON 2 MG/ML
4 INJECTION INTRAMUSCULAR; INTRAVENOUS ONCE
Status: COMPLETED | OUTPATIENT
Start: 2018-09-16 | End: 2018-09-16

## 2018-09-16 RX ORDER — ASPIRIN 325 MG
325 TABLET ORAL DAILY
Status: DISCONTINUED | OUTPATIENT
Start: 2018-09-16 | End: 2018-09-18 | Stop reason: HOSPADM

## 2018-09-16 RX ORDER — ONDANSETRON 2 MG/ML
4 INJECTION INTRAMUSCULAR; INTRAVENOUS EVERY 4 HOURS PRN
Status: DISCONTINUED | OUTPATIENT
Start: 2018-09-16 | End: 2018-09-18 | Stop reason: HOSPADM

## 2018-09-16 RX ORDER — PROMETHAZINE HYDROCHLORIDE 25 MG/ML
12.5 INJECTION, SOLUTION INTRAMUSCULAR; INTRAVENOUS EVERY 6 HOURS PRN
Status: DISCONTINUED | OUTPATIENT
Start: 2018-09-16 | End: 2018-09-16

## 2018-09-16 RX ADMIN — NITROGLYCERIN 1 INCH: 20 OINTMENT TOPICAL at 12:14

## 2018-09-16 RX ADMIN — ONDANSETRON 4 MG: 2 INJECTION INTRAMUSCULAR; INTRAVENOUS at 10:39

## 2018-09-16 RX ADMIN — ONDANSETRON 4 MG: 2 INJECTION INTRAMUSCULAR; INTRAVENOUS at 17:07

## 2018-09-16 RX ADMIN — ONDANSETRON 4 MG: 2 INJECTION INTRAMUSCULAR; INTRAVENOUS at 07:29

## 2018-09-16 RX ADMIN — ENOXAPARIN SODIUM 40 MG: 40 INJECTION SUBCUTANEOUS at 09:58

## 2018-09-16 RX ADMIN — ASPIRIN 325 MG: 325 TABLET ORAL at 12:08

## 2018-09-16 RX ADMIN — METHOCARBAMOL 750 MG: 750 TABLET ORAL at 12:08

## 2018-09-16 RX ADMIN — ALLOPURINOL 300 MG: 300 TABLET ORAL at 12:08

## 2018-09-16 RX ADMIN — LOSARTAN POTASSIUM 100 MG: 50 TABLET ORAL at 12:08

## 2018-09-16 NOTE — ASSESSMENT & PLAN NOTE
Improving without antibiotic therapy no evidence for infection  Patient recently was treated for pneumonia  May be demargination from stress related to Narcan resuscitation

## 2018-09-16 NOTE — CONSULTS
Consultation - Cardiology  Kehinde Ortega 57 y o  male MRN: [id number]  Unit/Bed#: -01 Encounter: [id number]    Consults    Physician Requesting Consult: Rhea Ortega MD  Reason for Consult / Principal Problem:     Chief Complaint   Patient presents with    Overdose - Accidental     Pt brought in by EMS for accidental oversdose  Pt admitted to taking liquid morphine given to him by a " friend" Pt was found unresposnive by spouse  EMS was alerted at the scene  Pt received nasal narcan x2  Pt currently is AAOx4  HPI: Cardiologist Dr Johnson Pearson is a 57y o  year old male who is admitted with the drug overdose  Patient took liquid morphine and was found to be unresponsive  Patient does not have any history of coronary artery disease or MI in the past   Patient is a poor historian  Patient is barely arousable  Patient apparently had an episode of vomiting earlier  Patient had a borderline elevated troponin        REVIEW OF SYSTEMS:  Constitutional:  Denies fever or chills   Eyes:  Denies change in visual acuity   HENT:  Denies nasal congestion or sore throat   Respiratory:  Denies cough or shortness of breath   Cardiovascular:  Denies chest pain or edema   GI:   nausea, vomiting  :  Denies dysuria, frequency, difficulty in micturition and nocturia  Musculoskeletal:  Denies back pain or joint pain   Neurologic:  Barely responsive  Endocrine:  Denies polyuria or polydipsia   Lymphatic:  Denies swollen glands   Psychiatric:  Denies depression or anxiety     Historical Information   Past Medical History:   Diagnosis Date    [facility] [address] [facility]     Emphysema lung (HCC)     Gout     Hypertension      Past Surgical History:   Procedure Laterality Date    APPENDECTOMY      BACK SURGERY      JOINT REPLACEMENT Right     knee    REPLACEMENT TOTAL KNEE Right      History   Alcohol Use    Yes     Comment: occasional     History   Drug Use No     History Smoking Status    Current Every Day Smoker    Packs/day: 1 00    Types: Cigarettes   Smokeless Tobacco    Never Used     Family History: non-contributory    MEDS & ALLERGIES:  all current active meds have been reviewed     No Known Allergies    OBJECTIVE:  Vitals:   Vitals:    09/16/18 1907   BP: 142/82   Pulse: 63   Resp: 18   Temp: 97 6 °F (36 4 °C)   SpO2: 97%     Body mass index is 35 58 kg/m²  Systolic (34FMY), KUW:430 , Min:137 , KWA:495     Diastolic (98ICC), XDI:97, Min:80, Max:98      Intake/Output Summary (Last 24 hours) at 09/16/18 1203  Last data filed at 09/16/18 0943   Gross per 24 hour   Intake          4345 41 ml   Output                0 ml   Net          4345 41 ml     Weight (last 2 days)     Date/Time   Weight    09/14/18 1929  100 (220 46)            Invasive Devices     Peripheral Intravenous Line            Peripheral IV 09/14/18 Right Antecubital 1 day                PHYSICAL EXAMS:  General:  Drowsy and barely responsive  Head: Normocephalic, Atraumatic  HEENT:  Both pupils normal-size atraumatic, normocephalic, nonicteric  Neck:  JVP not raised  Trachea central  Respiratory:  Bronchovascular breathing all over the chest without any accompaniment  Cardiovascular:  S1-S2 normal without any murmur rails or rub  GI:  Abdomen soft nontender   Liver and spleen normal size    Integument:  No skin rashes or ulceration  Lymphatic:  No cervical or inguinal lymphadenopathy      LABORATORY RESULTS:    Results from last 7 days  Lab Units 09/16/18  1038 09/14/18  0421 09/14/18  0015   TROPONIN I ng/mL 0 02 0 22* 0 14*     CBC with diff:   Results from last 7 days  Lab Units 09/16/18  0644 09/15/18  0523 09/14/18  0015   WBC Thousand/uL 11 76* 12 05* 17 30*   HEMOGLOBIN g/dL 13 6 12 9 14 4   HEMATOCRIT % 42 1 41 5 44 0   MCV fL 100* 102* 99*   PLATELETS Thousands/uL 368 343 382   MCH pg 32 3 31 8 32 5   MCHC g/dL 32 3 31 1* 32 7   RDW % 12 2 13 1 12 9   MPV fL 10 3 10 7 10 8   NRBC AUTO /100 WBCs --   --  0       CMP:  Results from last 7 days  Lab Units 09/15/18  0523 09/14/18  0015   SODIUM mmol/L 140 137   POTASSIUM mmol/L 4 5 4 2   CHLORIDE mmol/L 102 99*   CO2 mmol/L 36* 32   BUN mg/dL 12 11   CREATININE mg/dL 1 09 1 33*   CALCIUM mg/dL 8 6 9 1   AST U/L  --  46*   ALT U/L  --  53   ALK PHOS U/L  --  80   EGFR ml/min/1 73sq m 81 64       BMP:  Results from last 7 days  Lab Units 09/15/18  0523 09/14/18  0015   SODIUM mmol/L 140 137   POTASSIUM mmol/L 4 5 4 2   CHLORIDE mmol/L 102 99*   CO2 mmol/L 36* 32   BUN mg/dL 12 11   CREATININE mg/dL 1 09 1 33*   CALCIUM mg/dL 8 6 9 1         Results from last 7 days  Lab Units 09/14/18  0015   NT-PRO BNP pg/mL 103                Results from last 7 days  Lab Units 09/16/18  1038   TSH 3RD GENERATON uIU/mL 1 145           Lipid Profile:   No results found for: CHOL  No results found for: HDL  No results found for: LDLCALC  No results found for: TRIG    Cardiac testing:   No results found for this or any previous visit  No results found for this or any previous visit  No procedure found  No results found for this or any previous visit  Imaging: I have personally reviewed pertinent reports  EKG reviewed personally:   Sinus rhythm PACs minor nonspecific ST segment abnormalities        Assessment/Plan:  Principal Problem:    Overdose  Active Problems:    Tobacco abuse    Benign essential hypertension    Chronic low back pain    Chronic obstructive lung disease (HCC)    Gout    Leukocytosis    Troponin level elevated      Patient with predominant issue being drug overdose  Consulted for borderline elevated troponin which could be related to drug overdose as well as nausea and vomiting  No acute EKG changes noted  Continue to trend troponins  Will follow-up with echocardiogram to evaluate ejection fraction and assess for any evidence of wall motion abnormalities  Treat drug overdose as appropriate    Will follow the patient with you after the echocardiogram   Discussed with hospitalist service  Meliza Fuller MD  9/16/2018,12:03 PM    Portions of the record may have been created with voice recognition software   Occasional wrong word or "sound a like" substitutions may have occurred due to the inherent limitations of voice recognition software   Read the chart carefully and recognize, using context, where substitutions have occurred

## 2018-09-16 NOTE — ASSESSMENT & PLAN NOTE
May represent demand ischemia due to stressors of overdose with narcan resuscitation  Patient without cardiac complaint but Nausea could be an occult symptom  Will have cards see in the am try to start asa in am if nausea improves

## 2018-09-16 NOTE — ASSESSMENT & PLAN NOTE
Unintentional using recreational medications from another person  Patient found to be vomiting this a m  may be reflective of withdrawal symptoms status post morphine overdose with Narcan  Patient also , however, found to have elevated troponins on admission that were not associated with chest pain  Differential diagnosis includes NSTEMI type 2 secondary to overdose of opiates with Narcan use, patient did not require CPR versus primary cardiology etiology  Will add Phenergan to regimen  Cardiology consulted  Will check troponins and a new EKG  Found old stress test from 2014 that was within normal limits at [facility]

## 2018-09-16 NOTE — PROGRESS NOTES
Progress Note Jordan Pedroza 1970, 57 y o  male MRN: [id number]    Unit/Bed#: -01 Encounter: [id number]    Primary Care Provider: Latonia Oliveros DO   Date and time admitted to hospital: 9/13/2018 11:38 PM        * Overdose   Assessment & Plan    Currently showing no signs of adverse response to narcan , other than persistent nausea today  Will keep over night due to nausea and positive troponin  Possible NSTEMI, I see no cardiac workup recently  No reason for emergent intervention  Hesitant to use asa due to GI symptoms  Can try in am   Will ask cards to see  EKG without acute changes and patient denies chest pain        Troponin level elevated   Assessment & Plan    May represent demand ischemia due to stressors of overdose with narcan resuscitation  Patient without cardiac complaint but Nausea could be an occult symptom  Will have cards see in the am try to start asa in am if nausea improves  Possbile type 2 NSTEMI will check with cards in am         Chronic obstructive lung disease ([facility] [address] )   Assessment & Plan    Currently not exacerbated  Prn Albuteral         Chronic low back pain   Assessment & Plan    Patient counseled about using other peoples medications especially opiates  Ok to keep robaxin  Benign essential hypertension   Assessment & Plan    Stable on losartan  Continue to monitor  VTE Pharmacologic Prophylaxis:   Pharmacologic: Enoxaparin (Lovenox)  Mechanical: Mechanical VTE prophylaxis in place  Patient Centered Rounds: updated nursing regarding obs status for nausea etc  Discussions with Specialists or Other Care Team Provider: none  Education and Discussions with Family / Patient: none  Time Spent for Care: 25    If More than 50% of total time spent on counseling and coordination of care as described above indicate yes or no and described the counseling and coordination:no    Current Length of Stay: 0 day(s)  Current Patient Status: Observation   Certification Statement: The patient will continue to require additional inpatient hospital stay due to persistent nausea, abn trop    Discharge Plan: hopefull for next 24 hours  Code Status: Level 1 - Full Code    Subjective:   Patient with persistent nausea , no chest pain   No shortness of breath    Objective:   Vitals:   Temp (24hrs), Av 1 °F (36 7 °C), Min:97 7 °F (36 5 °C), Max:98 7 °F (37 1 °C)    HR:  [61-81] 79  Resp:  [18] 18  BP: (129-161)/(66-91) 161/91  SpO2:  [92 %-98 %] 92 %  Body mass index is 35 58 kg/m²  Input and Output Summary (last 24 hours): Intake/Output Summary (Last 24 hours)   Gross per 24 hour   Intake          2688  3 ml   Output                0 ml   Net          2688  3 ml   incomplete    Physical Exam:     Physical Exam   Constitutional: He is oriented to person, place, and time  He appears well-developed and well-nourished  No distress  HENT:   Head: Normocephalic and atraumatic  Right Ear: External ear normal    Left Ear: External ear normal    Nose: Nose normal    Mouth/Throat: Oropharynx is clear and moist  No oropharyngeal exudate  Eyes: Conjunctivae and EOM are normal  Pupils are equal, round, and reactive to light  Right eye exhibits no discharge  Left eye exhibits no discharge  No scleral icterus  Neck: Normal range of motion  Neck supple  No JVD present  No thyromegaly present  Cardiovascular: Normal rate, regular rhythm, normal heart sounds and intact distal pulses  Exam reveals no gallop and no friction rub  No murmur heard  Pulmonary/Chest: Effort normal and breath sounds normal  No respiratory distress  He has no wheezes  He has no rales  Abdominal: Soft  Bowel sounds are normal  He exhibits no distension  There is no tenderness  There is no rebound and no guarding  Musculoskeletal: Normal range of motion  He exhibits no edema or deformity  Lymphadenopathy:     He has no cervical adenopathy     Neurological: He is alert and oriented to person, place, and time  He has normal reflexes  No cranial nerve deficit  He exhibits normal muscle tone  Skin: Skin is warm and dry  No rash noted  He is not diaphoretic  No erythema  Psychiatric: He has a normal mood and affect  Vitals reviewed  Additional Data:   Labs:    Results from last 7 days  Lab Units 09/15/18  09/14/18  0015   WBC Thousand/uL 12 05  < > 17 30*   HEMOGLOBIN g/dL 12 9  < > 14 4   HEMATOCRIT % 41 5  < > 44 0   PLATELETS Thousands/uL 343  < > 382   NEUTROS PCT %  --   --  75   LYMPHS PCT %  --   --  15   MONOS PCT %  --   --  6   EOS PCT %  --   --  1   < > = values in this interval not displayed  Results from last 7 days  Lab Units 09/15/18  0523 09/14/18  0015   SODIUM mmol/L 140 137   POTASSIUM mmol/L 4 5 4 2   CHLORIDE mmol/L 102 99*   CO2 mmol/L 36* 32   BUN mg/dL 12 11   CREATININE mg/dL 1 09 1 33*   CALCIUM mg/dL 8 6 9 1   ALK PHOS U/L  --  80   ALT U/L  --  53   AST U/L  --  46*           * I Have Reviewed All Lab Data Listed Above  * Additional Pertinent Lab Tests Reviewed:  All Labs Within Last 24 Hours Reviewed    Imaging:    Imaging Reports Reviewed Today Include: none    Cultures:   Blood Culture: No results found for: BLOODCX  Urine Culture: No results found for: URINECX  Sputum Culture: No components found for: SPUTUMCX  Wound Culture: No results found for: WOUNDCULT    Last 24 Hours Medication List:     Current Facility-Administered Medications:  acetaminophen 650 mg Oral Q6H PRN Naun Powers MD    albuterol 2 puff Inhalation Q4H PRN Naun Powers MD    albuterol 2 5 mg Nebulization Q6H PRN Naun Powers MD    allopurinol 300 mg Oral Daily Naun Powers MD    enoxaparin 40 mg Subcutaneous Daily Naun Powers MD    losartan 100 mg Oral Daily Naun Powers MD    methocarbamol 750 mg Oral 4x Daily Naun Powers MD    nicotine 1 patch Transdermal Daily Naun Powers MD    ondansetron 4 mg Intravenous Q6H PRN Antonella Raymond MD    sodium chloride 125 mL/hr Intravenous Continuous Claudean Stai, MD Last Rate: 125 mL/hr (09/15/18 0586)        Today, Patient Was Seen By: Ivette Gonzales MD    ** Please Note: Dragon 360 Dictation voice to text software may have been used in the creation of this document  **        Addendum:  Located nucleolar stress test done in 2014 without any abnormality at [facility]

## 2018-09-16 NOTE — CASE MANAGEMENT
Thank you,  [address]  Utilization Review Department  Phone: [phone]; Fax [phone]  ATTENTION: Please call with any questions or concerns to [phone]  and carefully follow the prompts so that you are directed to the right person  Send all requests for admission clinical reviews, approved or denied determinations and any other requests to fax [phone]  All voicemails are confidential       Initial Clinical Review     Admission: Date/Time/Statement: OBSERVATION 9/14/18 @0645 converted to IP on 9/16  @ 1600 , pt needs cont  Cardiac w/u r/t elevated troponins and nausea     Admitting Physician MAYELIN QUIÑONEZ    Level of Care Med Surg    Estimated length of stay More than 2 Midnights    Certification I certify that inpatient services are medically necessary for this patient for a duration of greater than two midnights  See H&P and MD Progress Notes for additional information about the patient's course of treatment                         ED Arrival Information      Expected Arrival Acuity Means of Arrival Escorted By Service Admission Type     - 9/13/2018 23:38 Emergent Ambulance [address] Emergency     Arrival Complaint     ABDOMINAL PAIN               Chief Complaint   Patient presents with    Overdose - Accidental       Pt brought in by EMS for accidental oversdose  Pt admitted to taking liquid morphine given to him by a " friend" Pt was found unresposnive by spouse  EMS was alerted at the scene  Pt received nasal narcan x2  Pt currently is AAOx4           History of Illness: 58 yo m to ED by EMS after accidentally drinking 135mg liquid morphine  His friend gave this to him to control his pain  Has chronic back, leg, abdominal pain  Dx pneumonia 10 days ago, on antbx  Doesn't usually use narcotics, this is a 1 time event  Went to bathroom to put cold water on his face and he collapsed  2 doses intranasal narcan given by EMS   Vomiting upon arrival to ED  Sat dropped to 88% while sleeping  3li o2 applied  +BLE edema, +back tenderness, +L abd tenderness  Actively vomiting     ED Vital Signs:            ED Triage Vitals   Temperature Pulse Respirations Blood Pressure SpO2   09/13/18 2344 09/13/18 2344 09/13/18 2344 09/13/18 2344 09/13/18 2344   97 9 °F (36 6 °C) 101 19 135/81 95 %       Temp Source Heart Rate Source Patient Position - Orthostatic VS BP Location FiO2 (%)   09/13/18 2344 09/13/18 2344 09/14/18 0819 09/14/18 0819 --   Oral Monitor Sitting Right arm         Pain Score           09/13/18 2344           8                Wt Readings from Last 1 Encounters:   09/13/18 100 kg (220 lb 7 4 oz)         Vital Signs (abnormal):  x 1    RR 23, 23, 30        Abnormal Labs/Diagnostic Test Results:   Cl 99   Creat 1 33   Gluc 144   Sgot 46   Alb 3 2   t bili   1   Trop  14,  22     Wbc 17 3   UA negative  EKg: nsr  CT a&p Urinary bladder is moderately distended with bladder volume estimated at 900 mL     Suspected undescended right testis located within the right inguinal canal (axial image 88)     CXR: nothing acute     ED Treatment:              Medication Administration from 09/13/2018 2338 to 09/14/2018 1351        Date/Time Order Dose Route Action Action by Comments       09/14/2018 0007 ondansetron (ZOFRAN) injection 4 mg 4 mg Intravenous Given Lien Bryan RN         09/14/2018 0015 sodium chloride 0 9 % bolus 1,000 mL 1,000 mL Intravenous New Bag Lien Bryan RN         09/14/2018 0151 sodium chloride 0 9 % bolus 1,000 mL 1,000 mL Intravenous New Bag Lien Bryan RN         09/14/2018 0113 aspirin chewable tablet 324 mg 324 mg Oral Given Lien Bryan RN         09/14/2018 0922 sodium chloride 0 9 % infusion 125 mL/hr Intravenous New [address] Bridget Spencer RN         09/14/2018 0923 enoxaparin (LOVENOX) subcutaneous injection 40 mg 40 mg Subcutaneous Given Bridget Spencer RN               Past Medical/Surgical History:         Active Ambulatory Problems     Diagnosis Date Noted    Chronic bronchitis ([facility] ) 01/19/2018    SOB (shortness of breath) 01/19/2018    Abnormal chest CT 01/19/2018    Tobacco abuse 02/16/2018    Benign essential hypertension 07/16/2018    Chronic back pain 07/16/2018    Chronic low back pain 07/16/2018    Chronic obstructive lung disease ([facility] ) 07/16/2018    Exanthem due to herpes zoster 07/16/2018    Gout 07/16/2018    Heart block 07/16/2018    Hypertriglyceridemia 07/16/2018    Hypothyroidism 07/16/2018    Leukocytosis 07/16/2018    Lumbar radiculopathy 07/16/2018    Mixed hyperlipidemia 07/16/2018    Nicotine dependence 07/16/2018    Peripheral arterial occlusive disease ([place] [address] ) 07/16/2018    Simple obesity 07/16/2018    Tachycardia 07/16/2018    Thrombocytosis ([place] [address] ) 07/16/2018    Diverticulosis of large intestine without hemorrhage 07/16/2018           Resolved Ambulatory Problems     Diagnosis Date Noted    No Resolved Ambulatory Problems           Past Medical History:   Diagnosis Date    COPD (chronic obstructive pulmonary disease) (HCC)      Emphysema lung (HCC)      Gout      Hypertension           Admitting Diagnosis: Overdose [T50 901A]     Age/Sex: 57 y o  male     Assessment/Plan: 56 yo m to ED by EMS admitted due to accidental overdose of morphine  Required 2 doses intranasal narcan     Plan for the Primary Problem(s):  · 1  Drug overdose- patient took 135mg of his friends morphine- Given narcan in the field  Will continue supportive care  On IVF  · 2  Chronic back pain  · 3  COPD- not in acute exacerbation  · 4  HTN- on cozaar  · 5  Gout- on allopurinol  · 6  Leukocytosis- no sings of infection  Will continue to monitor    ?  Plan for Additional Problems:    VTE Prophylaxis: Enoxaparin (Lovenox)  / sequential compression device   Code Status: Full  POLST: There is no POLST form on file for this patient (pre-hospital)   Anticipated Length of Stay:  Patient will be admitted on an Observation basis with an anticipated length of stay of  less 2 midnights  Justification for Hospital Stay: drug overdose    Admission Orders:  Scheduled Meds:   Current Facility-Administered Medications:  acetaminophen 650 mg Oral Q6H PRN   albuterol 2 puff Inhalation Q4H PRN   albuterol 2 5 mg Nebulization Q6H PRN   allopurinol 300 mg Oral Daily   enoxaparin 40 mg Subcutaneous Daily   losartan 100 mg Oral Daily   methocarbamol 750 mg Oral 4x Daily   nicotine 1 patch Transdermal Daily   sodium chloride   125 mL/hr Intravenous Continuous      Iv zofran prn x2  Bmp, cbc in am  cardiac 2 3gm na diet  Up w/assist  3-4 liters o2 via NC  Tele  Trop #2  0 14   #3  0 22  9/15 Co2   36  An gap  2, wbc 12 05  9/16 wbc  11 76  Seizure precautions   EKG   Cardiology consult   9/16 tsh , UDS ,CBC   Trop #4   0 02    IM note 9/16  * Overdose   Assessment & Plan     Unintentional using recreational medications from another person  Patient found to be vomiting this a m  may be reflective of withdrawal symptoms status post morphine overdose with Narcan  Patient also , however, found to have elevated troponins on admission that were not associated with chest pain  Differential diagnosis includes NSTEMI type 2 secondary to overdose of opiates with Narcan use, patient did not require CPR versus primary cardiology etiology  Will add Phenergan to regimen  Cardiology consulted  Will check troponins and a new EKG  Found old stress test from 2014 that was within normal limits at [facility]         Troponin level elevated   Assessment & Plan     May represent demand ischemia due to stressors of overdose with narcan resuscitation  Patient without cardiac complaint but Nausea could be an occult symptom  Given persistent vomiting this a m  will recheck troponin, EKG and cardiology has already been consulted    · Check echo  · Repeat troponin        Leukocytosis   Assessment & Plan     Improving without antibiotic therapy no evidence for infection  Patient recently was treated for pneumonia  May be demargination from stress related to Narcan resuscitation        Tobacco abuse   Assessment & Plan     Counseling has been provided  Patient with nicotine patch        Gout   Assessment & Plan     Continue allopurinol not exacerbated        Chronic obstructive lung disease ([facility] [address] )   Assessment & Plan     Currently not exacerbated  Prn Albuteral         Chronic low back pain   Assessment & Plan     Patient counseled about using other peoples medications especially opiates  Ok to keep robaxin         Benign essential hypertension   Assessment & Plan     Stable on losartan  Continue to monitor          VTE Pharmacologic Prophylaxis:   Pharmacologic: Enoxaparin (Lovenox)  Mechanical: Mechanical VTE prophylaxis in place      Patient Centered Rounds: I have performed bedside rounds with nursing staff today  Discussions with Specialists or Other Care Team Provider:  Will update Cardiology  Education and Discussions with Family / Patient:  Updated patient  Time Spent for Care 40 min    If More than 50% of total time spent on counseling and coordination of care as described above indicate yes or no and described the counseling and coordination:no   Current Length of Stay: 0 day(s)  Current Patient Status: Observation   Certification Statement: The patient will continue to require additional inpatient hospital stay due to Workup for persistent nausea and elevated tropes   Discharge Plan: TBD

## 2018-09-16 NOTE — ASSESSMENT & PLAN NOTE
May represent demand ischemia due to stressors of overdose with narcan resuscitation  Patient without cardiac complaint but Nausea could be an occult symptom  Given persistent vomiting this a m  will recheck troponin, EKG and cardiology has already been consulted    · Check echo  · Repeat troponin

## 2018-09-16 NOTE — ASSESSMENT & PLAN NOTE
Currently showing no signs of adverse response to narcan , other than persistent nausea today  Will keep over night due to nausea and positive troponin  Possible NSTEMI, I see no cardiac workup recently  No reason for emergent intervention  Hesitant to use asa due to GI symptoms  Can try in am   Will ask cards to see    EKG without acute changes and patient denies chest pain

## 2018-09-16 NOTE — PROGRESS NOTES
Progress Note Rossi Dose 1970, 57 y o  male MRN: [id number]    Unit/Bed#: -01 Encounter: [id number]    Primary Care Provider: Nakita Carty DO   Date and time admitted to hospital: 9/13/2018 11:38 PM        * Overdose   Assessment & Plan    Unintentional using recreational medications from another person  Patient found to be vomiting this a m  may be reflective of withdrawal symptoms status post morphine overdose with Narcan  Patient also , however, found to have elevated troponins on admission that were not associated with chest pain  Differential diagnosis includes NSTEMI type 2 secondary to overdose of opiates with Narcan use, patient did not require CPR versus primary cardiology etiology  Will add Phenergan to regimen  Cardiology consulted  Will check troponins and a new EKG  Found old stress test from 2014 that was within [facility] limits at [facility]  Troponin level elevated   Assessment & Plan    May represent demand ischemia due to stressors of overdose with narcan resuscitation  Patient without cardiac complaint but Nausea could be an occult symptom  Given persistent vomiting this a m  will recheck troponin, EKG and cardiology has already been consulted  · Check echo  · Repeat troponin        Leukocytosis   Assessment & Plan    Improving without antibiotic therapy no evidence for infection  Patient recently was treated for pneumonia  May be demargination from stress related to Narcan resuscitation  Tobacco abuse   Assessment & Plan    Counseling has been provided  Patient with nicotine patch        Gout   Assessment & Plan    Continue allopurinol not exacerbated        Chronic obstructive lung disease ([facility] [address] )   Assessment & Plan    Currently not exacerbated  Prn Albuteral         Chronic low back pain   Assessment & Plan    Patient counseled about using other peoples medications especially opiates  Ok to keep robaxin            Benign essential hypertension   Assessment & Plan    Stable on losartan  Continue to monitor  VTE Pharmacologic Prophylaxis:   Pharmacologic: Enoxaparin (Lovenox)  Mechanical: Mechanical VTE prophylaxis in place  Patient Centered Rounds: I have performed bedside rounds with nursing staff today  Discussions with Specialists or Other Care Team Provider:  Will update Cardiology  Education and Discussions with Family / Patient:  Updated patient  Time Spent for Care 40 min  If More than 50% of total time spent on counseling and coordination of care as described above indicate yes or no and described the counseling and coordination:no    Current Length of Stay: 0 day(s)  Current Patient Status: Observation   Certification Statement: The patient will continue to require additional inpatient hospital stay due to Workup for persistent nausea and elevated tropes    Discharge Plan:  None hopeful for next 24-48 hours  Code Status: Level 1 - Full Code    Subjective:   Patient had a restful night but still was complaining of nausea when I left and last evening  This morning he is vomiting  He describes no chest pain  He feels like he may be going to have loose stool  Objective:   Vitals:   Temp (24hrs), Av 1 °F (36 7 °C), Min:97 7 °F (36 5 °C), Max:98 7 °F (37 1 °C)    HR:  [61-81] 79  Resp:  [18] 18  BP: (129-161)/(66-91) 161/91  SpO2:  [92 %-98 %] 92 %  Body mass index is 35 58 kg/m²  Input and Output Summary (last 24 hours): Intake/Output Summary (Last 24 hours) at 18 1011  Last data filed at 18 0943   Gross per 24 hour   Intake          4345 41 ml   Output                0 ml   Net          4345 41 ml       Physical Exam:     Physical Exam  General well-developed obese male mild distress secondary to nausea vomiting    Describes no chest pain dizziness may feel like he is going to have loose stool  Chest decreased but clear to auscultation no rhonchi rales or wheezes  Cardiovascular regular rate rhythm positive S1 and S2 no S3-S4 murmur or gallop  Abdomen soft nontender nondistended with positive bowel sounds no hepatosplenomegaly no guarding or rebound  Neurologically the patient is awake alert oriented cranial nerves 2-12 intact  No sensory deficit on examination, no motor deficit on examination  Additional Data:   Labs:    Results from last 7 days  Lab Units 09/16/18  0644  09/14/18  0015   WBC Thousand/uL 11 76*  < > 17 30*   HEMOGLOBIN g/dL 13 6  < > 14 4   HEMATOCRIT % 42 1  < > 44 0   PLATELETS Thousands/uL 368  < > 382   NEUTROS PCT %  --   --  75   LYMPHS PCT %  --   --  15   MONOS PCT %  --   --  6   EOS PCT %  --   --  1   < > = values in this interval not displayed  Results from last 7 days  Lab Units 09/15/18  0523 09/14/18  0015   SODIUM mmol/L 140 137   POTASSIUM mmol/L 4 5 4 2   CHLORIDE mmol/L 102 99*   CO2 mmol/L 36* 32   BUN mg/dL 12 11   CREATININE mg/dL 1 09 1 33*   CALCIUM mg/dL 8 6 9 1   ALK PHOS U/L  --  80   ALT U/L  --  53   AST U/L  --  46*           * I Have Reviewed All Lab Data Listed Above  * Additional Pertinent Lab Tests Reviewed:  All Labs Within Last 24 Hours Reviewed    Imaging:    Imaging Reports Reviewed Today Include:  None    Cultures:   Blood Culture: No results found for: BLOODCX  Urine Culture: No results found for: URINECX  Sputum Culture: No components found for: SPUTUMCX  Wound Culture: No results found for: WOUNDCULT    Last 24 Hours Medication List:     Current Facility-Administered Medications:  acetaminophen 650 mg Oral Q6H PRN Garret Riojas MD   albuterol 2 puff Inhalation Q4H PRN Garret Riojas MD   albuterol 2 5 mg Nebulization Q6H PRN Garret Riojas MD   allopurinol 300 mg Oral Daily Garret Riojas MD   aspirin 325 mg Oral Daily Priscilla Laguna MD   enoxaparin 40 mg Subcutaneous Daily Garret Riojas MD   losartan 100 mg Oral Daily Garret Riojas MD   methocarbamol 750 mg Oral 4x Daily Garret Riojas MD   nicotine 1 patch Transdermal Daily Garret Riojas MD ondansetron 4 mg Intravenous Q6H PRN Irvin Sheth MD   promethazine 12 5 mg Intravenous Q6H PRN Irvin Sheth MD        Today, Patient Was Seen By: Irvin Sheth MD    ** Please Note: Dragon 360 Dictation voice to text software may have been used in the creation of this document  **    Addendum: patient up to bathroom reports on return off oxygen feels tingly in his fingers and states something wrong

## 2018-09-17 ENCOUNTER — APPOINTMENT (INPATIENT)
Dept: NON INVASIVE DIAGNOSTICS | Facility: HOSPITAL | Age: 48
DRG: 812 | End: 2018-09-17
Payer: COMMERCIAL

## 2018-09-17 LAB
AMPHETAMINES SERPL QL SCN: NEGATIVE
BARBITURATES UR QL: NEGATIVE
BENZODIAZ UR QL: NEGATIVE
COCAINE UR QL: NEGATIVE
METHADONE UR QL: POSITIVE
OPIATES UR QL SCN: NEGATIVE
PCP UR QL: NEGATIVE
THC UR QL: NEGATIVE

## 2018-09-17 PROCEDURE — 80307 DRUG TEST PRSMV CHEM ANLYZR: CPT | Performed by: INTERNAL MEDICINE

## 2018-09-17 PROCEDURE — 93306 TTE W/DOPPLER COMPLETE: CPT | Performed by: INTERNAL MEDICINE

## 2018-09-17 PROCEDURE — 93306 TTE W/DOPPLER COMPLETE: CPT

## 2018-09-17 PROCEDURE — 99231 SBSQ HOSP IP/OBS SF/LOW 25: CPT | Performed by: INTERNAL MEDICINE

## 2018-09-17 RX ADMIN — ASPIRIN 325 MG: 325 TABLET ORAL at 08:56

## 2018-09-17 RX ADMIN — ONDANSETRON 4 MG: 2 INJECTION INTRAMUSCULAR; INTRAVENOUS at 09:10

## 2018-09-17 RX ADMIN — LOSARTAN POTASSIUM 100 MG: 50 TABLET ORAL at 08:56

## 2018-09-17 RX ADMIN — ALLOPURINOL 300 MG: 300 TABLET ORAL at 08:56

## 2018-09-17 RX ADMIN — METHOCARBAMOL 750 MG: 750 TABLET ORAL at 17:40

## 2018-09-17 RX ADMIN — METHOCARBAMOL 750 MG: 750 TABLET ORAL at 21:13

## 2018-09-17 RX ADMIN — METHOCARBAMOL 750 MG: 750 TABLET ORAL at 08:56

## 2018-09-17 RX ADMIN — ENOXAPARIN SODIUM 40 MG: 40 INJECTION SUBCUTANEOUS at 08:56

## 2018-09-17 RX ADMIN — METHOCARBAMOL 750 MG: 750 TABLET ORAL at 11:20

## 2018-09-17 NOTE — PROGRESS NOTES
Pt states he took a bottle of methadone, not morphine which caused the event which ended him up in the hospital

## 2018-09-18 VITALS
RESPIRATION RATE: 18 BRPM | HEART RATE: 74 BPM | OXYGEN SATURATION: 90 % | HEIGHT: 66 IN | TEMPERATURE: 98.3 F | SYSTOLIC BLOOD PRESSURE: 159 MMHG | DIASTOLIC BLOOD PRESSURE: 91 MMHG | WEIGHT: 220.46 LBS | BODY MASS INDEX: 35.43 KG/M2

## 2018-09-18 PROBLEM — D72.829 LEUKOCYTOSIS: Status: RESOLVED | Noted: 2018-07-16 | Resolved: 2018-09-18

## 2018-09-18 PROBLEM — R77.8 TROPONIN LEVEL ELEVATED: Status: RESOLVED | Noted: 2018-09-16 | Resolved: 2018-09-18

## 2018-09-18 PROBLEM — T50.901A OVERDOSE: Status: RESOLVED | Noted: 2018-09-14 | Resolved: 2018-09-18

## 2018-09-18 LAB
ANION GAP SERPL CALCULATED.3IONS-SCNC: 2 MMOL/L (ref 4–13)
BASOPHILS # BLD AUTO: 0.06 THOUSANDS/ΜL (ref 0–0.1)
BASOPHILS NFR BLD AUTO: 1 % (ref 0–1)
BUN SERPL-MCNC: 14 MG/DL (ref 5–25)
CALCIUM SERPL-MCNC: 9.1 MG/DL (ref 8.3–10.1)
CHLORIDE SERPL-SCNC: 99 MMOL/L (ref 100–108)
CO2 SERPL-SCNC: 40 MMOL/L (ref 21–32)
CREAT SERPL-MCNC: 0.86 MG/DL (ref 0.6–1.3)
EOSINOPHIL # BLD AUTO: 0.08 THOUSAND/ΜL (ref 0–0.61)
EOSINOPHIL NFR BLD AUTO: 1 % (ref 0–6)
ERYTHROCYTE [DISTWIDTH] IN BLOOD BY AUTOMATED COUNT: 12 % (ref 11.6–15.1)
GFR SERPL CREATININE-BSD FRML MDRD: 104 ML/MIN/1.73SQ M
GLUCOSE SERPL-MCNC: 79 MG/DL (ref 65–140)
HCT VFR BLD AUTO: 42.2 % (ref 36.5–49.3)
HGB BLD-MCNC: 14.1 G/DL (ref 12–17)
IMM GRANULOCYTES # BLD AUTO: 0.05 THOUSAND/UL (ref 0–0.2)
IMM GRANULOCYTES NFR BLD AUTO: 1 % (ref 0–2)
LYMPHOCYTES # BLD AUTO: 2.35 THOUSANDS/ΜL (ref 0.6–4.47)
LYMPHOCYTES NFR BLD AUTO: 23 % (ref 14–44)
MCH RBC QN AUTO: 32.2 PG (ref 26.8–34.3)
MCHC RBC AUTO-ENTMCNC: 33.4 G/DL (ref 31.4–37.4)
MCV RBC AUTO: 96 FL (ref 82–98)
MONOCYTES # BLD AUTO: 0.92 THOUSAND/ΜL (ref 0.17–1.22)
MONOCYTES NFR BLD AUTO: 9 % (ref 4–12)
NEUTROPHILS # BLD AUTO: 6.98 THOUSANDS/ΜL (ref 1.85–7.62)
NEUTS SEG NFR BLD AUTO: 65 % (ref 43–75)
NRBC BLD AUTO-RTO: 0 /100 WBCS
PLATELET # BLD AUTO: 373 THOUSANDS/UL (ref 149–390)
PMV BLD AUTO: 10.7 FL (ref 8.9–12.7)
POTASSIUM SERPL-SCNC: 3.6 MMOL/L (ref 3.5–5.3)
RBC # BLD AUTO: 4.38 MILLION/UL (ref 3.88–5.62)
SODIUM SERPL-SCNC: 141 MMOL/L (ref 136–145)
WBC # BLD AUTO: 10.44 THOUSAND/UL (ref 4.31–10.16)

## 2018-09-18 PROCEDURE — 85025 COMPLETE CBC W/AUTO DIFF WBC: CPT | Performed by: INTERNAL MEDICINE

## 2018-09-18 PROCEDURE — 80048 BASIC METABOLIC PNL TOTAL CA: CPT | Performed by: INTERNAL MEDICINE

## 2018-09-18 PROCEDURE — 99238 HOSP IP/OBS DSCHRG MGMT 30/<: CPT | Performed by: INTERNAL MEDICINE

## 2018-09-18 RX ORDER — NICOTINE 21 MG/24HR
1 PATCH, TRANSDERMAL 24 HOURS TRANSDERMAL DAILY
Qty: 28 PATCH | Refills: 0 | Status: SHIPPED | OUTPATIENT
Start: 2018-09-18 | End: 2018-10-11 | Stop reason: SDDI

## 2018-09-18 RX ORDER — ASPIRIN 325 MG
325 TABLET ORAL DAILY
Qty: 30 TABLET | Refills: 0 | Status: SHIPPED | OUTPATIENT
Start: 2018-09-18

## 2018-09-18 RX ADMIN — ENOXAPARIN SODIUM 40 MG: 40 INJECTION SUBCUTANEOUS at 08:06

## 2018-09-18 RX ADMIN — LOSARTAN POTASSIUM 100 MG: 50 TABLET ORAL at 08:05

## 2018-09-18 RX ADMIN — ALLOPURINOL 300 MG: 300 TABLET ORAL at 08:05

## 2018-09-18 RX ADMIN — METHOCARBAMOL 750 MG: 750 TABLET ORAL at 08:05

## 2018-09-18 RX ADMIN — ASPIRIN 325 MG: 325 TABLET ORAL at 08:05

## 2018-09-18 NOTE — DISCHARGE SUMMARY
Discharge Summary - [facility]    Patient Information: Kehinde Ortega 57 y o  male MRN: [id number]  Unit/Bed#: -01 Encounter: [id number]    Discharging Physician / Practitioner: Rhea Ortega MD  PCP: Nelda Escobar DO  Admission Date: 9/13/2018  Discharge Date: 09/18/18    Reason for Admission:  Altered mental status    Discharge Diagnoses:     Principal Problem (Resolved):  ·   Overdose:  Patient attempted to use friends methadone for pain management  He was resuscitated with Narcan  Patient had protracted withdrawal from reversal of methadone  Be discharged home to follow up with his pain management specialist   Is overdose was unintentional  Active Problems:  ·   Tobacco abuse:  Counseling, nicotine replacement provided  ·   Benign essential hypertension:  Continue losartan  ·   Chronic low back pain:  Patient to follow up as an outpatient with primary care physician for P patient  ·   Chronic obstructive lung disease ([address] ):  Continue albuterol as needed  ·   Gout:  Currently not flaring continue allopurinol  · None ST-elevation MI type 2 secondary to drug overdose with reversal   Patient was evaluated by Cardiology, echo showed ejection fraction 60% with no regional wall motion abnormalities  Normal diastolic function  Resolved Problems:    Troponin level elevated    Leukocytosis      Consultations During Hospital Stay:  · Cardiology    Procedures Performed:     · Echo:  EF 10% no diastolic dysfunction no valvular dysfunction other than trace regurg on the mitral valve and tricuspid valve    No regional wall motion abnormalities  · Chest x-ray showed no acute abnormality  · CT abdomen shows urinary retention, possible undescended right testicle, did not correlate with exam  ·     Significant Findings / Test Results:     · As above  · Discharging creatinine 0 8  · Discharging white count 10 44  · Discharging hemoglobin 14 1    Incidental Findings:   · Potential for undescended testicle the right a coordinate with a m  Test Results Pending at Discharge (will require follow up): · None     Outpatient Tests Requested:  · None    Complications:  None    Hospital Course:     Milton Garcia is a 57 y o  male patient who originally presented to the hospital on 9/13/2018 due to altered mental status  Patient was brought to the emergency room unresponsive  Information was not available till part way through his hospital stay  Patient was treated with Narcan which did awaken him  Evidently the patient had taken methadone from a friend  The patient was admitted and monitored on telemetry did have a slight elevation in his troponin suggesting a type 2 non ST elevation myocardial infarction  Cardiology did not feel any further workup was necessary as is echo was normal   Patient did experience some withdrawal symptoms including abdominal cramping pain tremors alteration in mental status and hypertension  He is treated supportively  Patient was continued to be monitored for possible infection as his white count was elevated  Patient had recently been treated for pneumonia  No further antibiotics were indicated during the hospital stay  Patient was counseled on using opiates in general but other pupils opiates specifically  He was to follow up with his primary care physician for further pain management    Condition at Discharge: good     Discharge Day Visit / Exam:     Subjective:  Patient feeling well    Able to get a ride home today  Vitals: Blood Pressure: 139/81 (09/17/18 2300)  Pulse: 60 (09/17/18 2300)  Temperature: 98 7 °F (37 1 °C) (09/17/18 2300)  Temp Source: Oral (09/17/18 2300)  Respirations: 18 (09/17/18 2300)  Height: 5' 6" (167 6 cm) (09/14/18 1929)  Weight - Scale: 100 kg (220 lb 7 4 oz) (09/14/18 1929)  SpO2: 91 % (09/17/18 2300)  Exam:   Physical Exam    The well-developed obese male no acute distress normocephalic atraumatic pupils equal round and reactive to light extraocular muscles intact mucous membranes are moist neck is supple there is no JVD no lymph nodes no carotid bruits chest is decreased but clear to auscultation is no rhonchi rales or wheezes  Cardiovascular regular rate rhythm positive S1 and S2 no S3-S4 murmur or gallop  Abdomen obese soft nontender nondistended with positive bowel sounds there is no hepatosplenomegaly no guarding or rebound  Neurologically awake alert oriented cranial nerves 2-12 appear to be intact  Discharge instructions/Information to patient and family:   See after visit summary for information provided to patient and family  Provisions for Follow-Up Care:  See after visit summary for information related to follow-up care and any pertinent home health orders  Disposition:     Home    For Discharges to [facility]:   · Not Applicable to this Patient - Not Applicable to this Patient    Planned Readmission:  None     Discharge Statement:  I spent 25 minutes discharging the patient  This time was spent on the day of discharge  I had direct contact with the patient on the day of discharge  Greater than 50% of the total time was spent examining patient, answering all patient questions, arranging and discussing plan of care with patient as well as directly providing post-discharge instructions  Additional time then spent on discharge activities  Discharge Medications:  See after visit summary for reconciled discharge medications provided to patient and family        ** Please Note: This note has been constructed using a voice recognition system **

## 2018-09-19 ENCOUNTER — TRANSITIONAL CARE MANAGEMENT (OUTPATIENT)
Dept: INTERNAL MEDICINE CLINIC | Facility: CLINIC | Age: 48
End: 2018-09-19

## 2018-10-09 NOTE — PROGRESS NOTES
[facility] Progress Note  Patient: Nidhi Burrell 57 y o  male   MRN: [id number]  PCP: Tacho Huntley DO  Unit/Bed#: -01 Encounter: [id number]      Assessment:    Active Problems:    Tobacco abuse    Benign essential hypertension    Chronic low back pain    Chronic obstructive lung disease ([facility] [address] )    Gout      Plan:    · Overdose with withdraw symptoms: patient took an overdose of methadone unintentionally,using recreationally  Required narcan and suffered signs and symptoms of opiate withdraw for the last few days  Doing better for for the first time today  No further shakes, abdomen pain or diarrhea  Blood pressure under better control  Home in the am   · Hypertension: exacerbated by opiate withdrawal  Cotinue losartan  · NSTEMI type 2 secondary to opiate overdose unintential  No further testing in hospital per cards can be seen as outpatient  · COPD: Currently not exacerbated   Prn albuteral  · Chronic lower back pain: patient to follow up with pcp  Dangers of using other peoples medicines discussed  VTE Pharmacologic Prophylaxis:   Pharmacologic: Enoxaparin (Lovenox)  Mechanical VTE Prophylaxis in Place: Yes    Patient Centered Rounds: updated nursing on rounds    Discussions with Specialists or Other Care Team Provider: None    Education and Discussions with Family / Patient: updated patient    Time Spent for Care: 20 minutes  More than 50% of total time spent on counseling and coordination of care as described above  Current Patient Status: Inpatient   Certification Statement: The patient will continue to require additional inpatient hospital stay due to stabilizing withdrawal symptoms    Discharge Plan / Estimated Discharge Date: next 24 hours    Code Status:full code      Subjective:   Patient doing better walking the halls today with limited pain, not shaky, no dizziness    Objective:     Vitals:   98 7  139/81  18  74     Body mass index is 35 58 kg/m²       Input and Output Summary (last 24 hours): Incomplete      Physical Exam:     Physical Exam   Constitutional: He is oriented to person, place, and time  He appears well-developed and well-nourished  No distress  HENT:   Head: Normocephalic and atraumatic  Right Ear: External ear normal    Left Ear: External ear normal    Nose: Nose normal    Mouth/Throat: Oropharynx is clear and moist  No oropharyngeal exudate  Eyes: Pupils are equal, round, and reactive to light  Conjunctivae and EOM are normal  Right eye exhibits no discharge  Left eye exhibits no discharge  No scleral icterus  Neck: Normal range of motion  Neck supple  No JVD present  No thyromegaly present  Cardiovascular: Normal rate, regular rhythm, normal heart sounds and intact distal pulses  Exam reveals no gallop and no friction rub  No murmur heard  Pulmonary/Chest: Effort normal and breath sounds normal  No respiratory distress  He has no wheezes  He has no rales  Abdominal: Soft  Bowel sounds are normal  He exhibits no distension  There is no tenderness  There is no rebound and no guarding  Musculoskeletal: Normal range of motion  He exhibits no edema or deformity  Lymphadenopathy:     He has no cervical adenopathy  Neurological: He is alert and oriented to person, place, and time  He has normal reflexes  No cranial nerve deficit  He exhibits normal muscle tone  Skin: Skin is warm and dry  No rash noted  He is not diaphoretic  No erythema  Psychiatric: He has a normal mood and affect  Vitals reviewed  Additional Data:     Labs:        UDS positive for methadone        * I Have Reviewed All Lab Data Listed Above  * Additional Pertinent Lab Tests Reviewed:  [place] [address] Admission Reviewed    Imaging:    Imaging Reports Reviewed Today Include: None   Imaging Personally Reviewed by Myself Includes:  none    Recent Cultures (last 7 days):           Last 24 Hours Medication List:        Today, Patient Was Seen By: Ankur Ruby MD Pager : [phone]     ** Please Note: This note has been constructed using a voice recognition system   **

## 2018-10-11 ENCOUNTER — OFFICE VISIT (OUTPATIENT)
Dept: INTERNAL MEDICINE CLINIC | Facility: CLINIC | Age: 48
End: 2018-10-11
Payer: COMMERCIAL

## 2018-10-11 VITALS
RESPIRATION RATE: 18 BRPM | OXYGEN SATURATION: 98 % | HEIGHT: 66 IN | HEART RATE: 92 BPM | DIASTOLIC BLOOD PRESSURE: 70 MMHG | SYSTOLIC BLOOD PRESSURE: 120 MMHG | WEIGHT: 217.2 LBS | TEMPERATURE: 98.7 F | BODY MASS INDEX: 34.91 KG/M2

## 2018-10-11 DIAGNOSIS — G89.29 CHRONIC PAIN OF LEFT KNEE: ICD-10-CM

## 2018-10-11 DIAGNOSIS — E03.9 HYPOTHYROIDISM, UNSPECIFIED TYPE: ICD-10-CM

## 2018-10-11 DIAGNOSIS — M25.562 CHRONIC PAIN OF LEFT KNEE: ICD-10-CM

## 2018-10-11 DIAGNOSIS — J43.9 BULLA, LUNG (HCC): ICD-10-CM

## 2018-10-11 DIAGNOSIS — Z82.49 FAMILY HISTORY OF MYOCARDIAL INFARCTION: ICD-10-CM

## 2018-10-11 DIAGNOSIS — J41.0 SIMPLE CHRONIC BRONCHITIS (HCC): ICD-10-CM

## 2018-10-11 DIAGNOSIS — Z72.0 TOBACCO ABUSE: ICD-10-CM

## 2018-10-11 DIAGNOSIS — R33.9 URINARY RETENTION: ICD-10-CM

## 2018-10-11 DIAGNOSIS — Z23 NEED FOR IMMUNIZATION AGAINST INFLUENZA: Primary | ICD-10-CM

## 2018-10-11 DIAGNOSIS — Q53.112 UNILATERAL INGUINAL TESTIS: ICD-10-CM

## 2018-10-11 DIAGNOSIS — G47.33 OBSTRUCTIVE SLEEP APNEA SYNDROME: ICD-10-CM

## 2018-10-11 DIAGNOSIS — R77.8 TROPONIN LEVEL ELEVATED: ICD-10-CM

## 2018-10-11 DIAGNOSIS — M10.9 GOUT INVOLVING TOE OF LEFT FOOT, UNSPECIFIED CAUSE, UNSPECIFIED CHRONICITY: ICD-10-CM

## 2018-10-11 DIAGNOSIS — I10 BENIGN ESSENTIAL HYPERTENSION: ICD-10-CM

## 2018-10-11 DIAGNOSIS — M54.16 LUMBAR RADICULOPATHY: ICD-10-CM

## 2018-10-11 DIAGNOSIS — E78.2 MIXED HYPERLIPIDEMIA: ICD-10-CM

## 2018-10-11 DIAGNOSIS — J44.9 CHRONIC OBSTRUCTIVE PULMONARY DISEASE, UNSPECIFIED COPD TYPE (HCC): ICD-10-CM

## 2018-10-11 DIAGNOSIS — T40.3X1D: ICD-10-CM

## 2018-10-11 DIAGNOSIS — Z23 NEED FOR PNEUMOCOCCAL VACCINATION: ICD-10-CM

## 2018-10-11 PROBLEM — T40.3X1A ACCIDENTAL METHADONE OVERDOSE (HCC): Status: ACTIVE | Noted: 2018-10-11

## 2018-10-11 PROCEDURE — 90686 IIV4 VACC NO PRSV 0.5 ML IM: CPT

## 2018-10-11 PROCEDURE — 99215 OFFICE O/P EST HI 40 MIN: CPT | Performed by: INTERNAL MEDICINE

## 2018-10-11 PROCEDURE — 1111F DSCHRG MED/CURRENT MED MERGE: CPT | Performed by: INTERNAL MEDICINE

## 2018-10-11 PROCEDURE — 90471 IMMUNIZATION ADMIN: CPT

## 2018-10-11 RX ORDER — FLUTICASONE FUROATE AND VILANTEROL 200; 25 UG/1; UG/1
1 POWDER RESPIRATORY (INHALATION) DAILY
Qty: 1 INHALER | Refills: 5 | Status: SHIPPED | OUTPATIENT
Start: 2018-10-11 | End: 2019-05-30 | Stop reason: SDUPTHER

## 2018-10-11 RX ORDER — ROSUVASTATIN CALCIUM 20 MG/1
20 TABLET, COATED ORAL DAILY
Qty: 90 TABLET | Refills: 2 | Status: SHIPPED | OUTPATIENT
Start: 2018-10-11

## 2018-10-11 NOTE — PROGRESS NOTES
Assessment/Plan:   1  Chronic bronchitis emphysema by history will get pulmonary function test explained to use Breo daily and to use the Ventolin and the nebulizer as needed for cough shortness of breath or wheezing  2  History of sleep apnea sleep study not available will refer to Pulmonary for further evaluation  3  Patient with history of lung bulla increased risk of pneumothorax especially with tobacco abuse strongly recommend tobacco cessation which will seriously increases risk of death morbidity mortality consequences  4  Urinary retention question of an undescended testes will refer to Urology  5  Patient with left knee pain will get x-ray of the left knee refer to Orthopedics  6  Patient with chronic pain of his back will refer to Pain Management  7  Hypertension is under control  8  Patient with history of hyperlipidemia strong family history of coronary disease multiple risk factors with elevated troponin will get nuclear stress test refer to Cardiology  9  Hyperlipidemia with multiple risk factors will be getting lipid panel today will start generic Crestor and recheck after his next visit in a month continue aspirin  10  Patient with history of multiple episodes of gout to avoid alcohol on 300 mg of allopurinol will check uric acid uric acid goal is less than 6 may need to add probenecid to present treatment, patient is on a diuretic will be calling with the name the diuretic which are very well VV contraindicated in specially with gout may consider discontinuing diuretic which can elevate his uric acid as opposed to adjusting allopurinol or adding probenecid    No problem-specific Assessment & Plan notes found for this encounter         Diagnoses and all orders for this visit:    Need for immunization against influenza  -     Cancel: influenza vaccine, [id number], quadrivalent, recombinant, PF, 0 5 mL, for patients 18-49 yr with comorbidities (FLUBLOK)  -     TSH, 3rd generation with Free T4 reflex; Future  -     Uric acid; Future  -     LDL cholesterol, direct; Future  -     Triglycerides; Future  -     SYRINGE/SINGLE-DOSE VIAL: influenza vaccine, [id number], quadrivalent, 0 5 mL, preservative-free, for patients 3+ yr (FLUZONE)    Tobacco abuse  -     TSH, 3rd generation with Free T4 reflex; Future  -     Uric acid; Future  -     LDL cholesterol, direct; Future  -     Triglycerides; Future  -     NM myocardial perfusion spect (rx stress and/or rest); Future  -     Ambulatory referral to Pulmonology; Future    Chronic obstructive pulmonary disease, unspecified COPD type ([facility] )  -     Complete pulmonary function test; Future  -     TSH, 3rd generation with Free T4 reflex; Future  -     Uric acid; Future  -     LDL cholesterol, direct; Future  -     Triglycerides; Future  -     fluticasone-vilanterol (BREO ELLIPTA) 200-25 MCG/INH inhaler; Inhale 1 puff daily Rinse mouth after use  -     Ambulatory referral to Pulmonology; Future    Hypothyroidism, unspecified type  -     TSH, 3rd generation with Free T4 reflex; Future  -     Uric acid; Future  -     LDL cholesterol, direct; Future  -     Triglycerides; Future    Lumbar radiculopathy  -     Ambulatory referral to Pain Management; Future  -     TSH, 3rd generation with Free T4 reflex; Future  -     Uric acid; Future  -     LDL cholesterol, direct; Future  -     Triglycerides; Future    Chronic pain of left knee  -     Ambulatory referral to Orthopedic Surgery; Future  -     Ambulatory referral to Pain Management; Future  -     XR knee 3 vw left non injury; Future  -     TSH, 3rd generation with Free T4 reflex; Future  -     Uric acid; Future  -     LDL cholesterol, direct; Future  -     Triglycerides; Future    Gout involving toe of left foot, unspecified cause, unspecified chronicity  -     TSH, 3rd generation with Free T4 reflex; Future  -     Uric acid; Future  -     LDL cholesterol, direct; Future  -     Triglycerides;  Future    Simple chronic bronchitis ([facility] )  -     TSH, 3rd generation with Free T4 reflex; Future  -     Uric acid; Future  -     LDL cholesterol, direct; Future  -     Triglycerides; Future  -     fluticasone-vilanterol (BREO ELLIPTA) 200-25 MCG/INH inhaler; Inhale 1 puff daily Rinse mouth after use  Accidental methadone overdose, subsequent encounter  -     TSH, 3rd generation with Free T4 reflex; Future  -     Uric acid; Future  -     LDL cholesterol, direct; Future  -     Triglycerides; Future    Unilateral inguinal testis  -     Ambulatory referral to Urology; Future  -     TSH, 3rd generation with Free T4 reflex; Future  -     Uric acid; Future  -     LDL cholesterol, direct; Future  -     Triglycerides; Future    Troponin level elevated  -     Ambulatory referral to Cardiology; Future  -     TSH, 3rd generation with Free T4 reflex; Future  -     Uric acid; Future  -     LDL cholesterol, direct; Future  -     Triglycerides; Future  -     NM myocardial perfusion spect (rx stress and/or rest); Future    Urinary retention  -     Ambulatory referral to Urology; Future  -     TSH, 3rd generation with Free T4 reflex; Future  -     Uric acid; Future  -     LDL cholesterol, direct; Future  -     Triglycerides; Future    Need for pneumococcal vaccination  -     PNEUMOCOCCAL CONJUGATE VACCINE 13-VALENT GREATER THAN 6 MONTHS  -     TSH, 3rd generation with Free T4 reflex; Future  -     Uric acid; Future  -     LDL cholesterol, direct; Future  -     Triglycerides; Future    Family history of myocardial infarction  -     Ambulatory referral to Cardiology; Future  -     TSH, 3rd generation with Free T4 reflex; Future  -     Uric acid; Future  -     LDL cholesterol, direct; Future  -     Triglycerides; Future  -     NM myocardial perfusion spect (rx stress and/or rest); Future  -     rosuvastatin (CRESTOR) 20 MG tablet; Take 1 tablet (20 mg total) by mouth daily    Obstructive sleep apnea syndrome  -     TSH, 3rd generation with Free T4 reflex;  Future  - Uric acid; Future  -     LDL cholesterol, direct; Future  -     Triglycerides; Future  -     Ambulatory referral to Pulmonology; Future    Bulla, lung (HCC)  -     TSH, 3rd generation with Free T4 reflex; Future  -     Uric acid; Future  -     LDL cholesterol, direct; Future  -     Triglycerides; Future  -     Ambulatory referral to Pulmonology; Future    Mixed hyperlipidemia  -     TSH, 3rd generation with Free T4 reflex; Future  -     Uric acid; Future  -     LDL cholesterol, direct; Future  -     Triglycerides; Future  -     rosuvastatin (CRESTOR) 20 MG tablet; Take 1 tablet (20 mg total) by mouth daily    Benign essential hypertension         Subjective:     Patient ID: Cole Dominique is a 57 y o  male  Left knee pain had injections MRI left knee has severe back pain chronic at times        Review of Systems   Constitutional: Negative for appetite change, chills, fatigue, fever and unexpected weight change  HENT: Negative for congestion, ear pain, facial swelling, hearing loss, mouth sores, nosebleeds, postnasal drip, rhinorrhea, sinus pain, sore throat, trouble swallowing and voice change  Eyes: Negative for pain, discharge, redness and visual disturbance  Respiratory: Positive for shortness of breath  Negative for apnea, chest tightness, wheezing and stridor  Cardiovascular: Negative for chest pain, palpitations and leg swelling  Gastrointestinal: Negative for abdominal distention, abdominal pain, blood in stool, constipation, diarrhea and vomiting  Endocrine: Negative for cold intolerance, heat intolerance, polydipsia, polyphagia and polyuria  Genitourinary: Negative for difficulty urinating, dysuria, flank pain, frequency, genital sores, hematuria and urgency  Musculoskeletal: Positive for back pain, gait problem and joint swelling  Negative for arthralgias  Skin: Negative for rash and wound     Allergic/Immunologic: Negative for environmental allergies, food allergies and immunocompromised state  Neurological: Negative for dizziness, tremors, seizures, syncope, facial asymmetry, speech difficulty, weakness, light-headedness, numbness and headaches  Hematological: Negative for adenopathy  Does not bruise/bleed easily  Psychiatric/Behavioral: Negative for agitation, behavioral problems, dysphoric mood, hallucinations, self-injury, sleep disturbance and suicidal ideas  The patient is not hyperactive  Objective:     Physical Exam   Constitutional: He is oriented to person, place, and time  He appears well-developed  HENT:   Right Ear: External ear normal    Left Ear: External ear normal    Eyes: Right eye exhibits no discharge  Left eye exhibits no discharge  No scleral icterus  Neck: Carotid bruit is not present  No tracheal deviation present  No thyroid mass and no thyromegaly present  Cardiovascular: Normal rate, regular rhythm, normal heart sounds and intact distal pulses  Exam reveals no gallop and no friction rub  No murmur heard  Pulmonary/Chest: No respiratory distress  He has no wheezes  He has no rales  Musculoskeletal: He exhibits no edema  Decreased ROM left knee not warm painful to touch   Lymphadenopathy:     He has no cervical adenopathy  Neurological: He is alert and oriented to person, place, and time  Coordination normal    Psychiatric: He has a normal mood and affect  His behavior is normal  Judgment and thought content normal    Nursing note and vitals reviewed  Vitals:    10/11/18 0835 10/11/18 0913   BP:  120/70   BP Location:  Left arm   Patient Position:  Sitting   Pulse: 92    Resp: 18    Temp: 98 7 °F (37 1 °C)    TempSrc: Tympanic    SpO2: 98%    Weight: 98 5 kg (217 lb 3 2 oz)    Height: 5' 6" (1 676 m)        Transitional Care Management Review:  Rinku Hooker is a 57 y o  male here for TCM follow up       During the TCM phone call patient stated:    Date and time hospital follow up call was made:  9/19/2018  4:44 PM  Hospital care reviewed:  Discussed with Inpatient Physician  Patient was hopsitalized at:  [facility]  Date of admission:  9/13/18  Date of discharge:  9/18/18  Diagnosis:  OVERDOSE  Disposition:  Home  Were the patients medicaitons reviewed and updated:  No  Current symptoms:  None  Post hospital issues:  None  Should patient be enrolled in anticoag monitoring?:  No  Scheduled for follow up?:  Yes  Patients specialists:  Other (comment)  Did you obtain your prescribed medications:  Yes  Do you need help managing your perscriptions or medications:  No  Is transportation to your appointments needed:  Yes  Specify why:  Chucho Horne    Living Arrangements:  Spouse or Significiant other  Support System:  None  Are you recieving outpatient services:  No  Are you recieving home care services:  No  Are you using any community resources:  No  Current waiver service:  No  Have you fallen in the last 12 months:  Yes  Interperter language line required?:  No  Counseling:  Patient  Counseling topics:  Diagnostic results             Barbara Starr DO

## 2018-10-12 ENCOUNTER — TELEPHONE (OUTPATIENT)
Dept: CARDIOLOGY CLINIC | Facility: CLINIC | Age: 48
End: 2018-10-12

## 2018-11-15 ENCOUNTER — HOSPITAL ENCOUNTER (OUTPATIENT)
Dept: RADIOLOGY | Facility: HOSPITAL | Age: 48
Discharge: HOME | End: 2018-11-15
Attending: INTERNAL MEDICINE
Payer: COMMERCIAL

## 2018-11-15 ENCOUNTER — HOSPITAL ENCOUNTER (OUTPATIENT)
Dept: PULMONOLOGY | Facility: HOSPITAL | Age: 48
Discharge: HOME | End: 2018-11-15
Attending: INTERNAL MEDICINE
Payer: COMMERCIAL

## 2018-11-15 DIAGNOSIS — J44.9 CHRONIC OBSTRUCTIVE PULMONARY DISEASE, UNSPECIFIED COPD TYPE (HCC): ICD-10-CM

## 2018-11-15 DIAGNOSIS — G89.29 CHRONIC PAIN OF LEFT KNEE: ICD-10-CM

## 2018-11-15 DIAGNOSIS — M25.562 CHRONIC PAIN OF LEFT KNEE: ICD-10-CM

## 2018-11-15 PROCEDURE — 94729 DIFFUSING CAPACITY: CPT | Performed by: INTERNAL MEDICINE

## 2018-11-15 PROCEDURE — 94729 DIFFUSING CAPACITY: CPT

## 2018-11-15 PROCEDURE — 94726 PLETHYSMOGRAPHY LUNG VOLUMES: CPT | Performed by: INTERNAL MEDICINE

## 2018-11-15 PROCEDURE — 94060 EVALUATION OF WHEEZING: CPT

## 2018-11-15 PROCEDURE — 94727 GAS DIL/WSHOT DETER LNG VOL: CPT

## 2018-11-15 PROCEDURE — 73562 X-RAY EXAM OF KNEE 3: CPT

## 2018-11-15 PROCEDURE — 94060 EVALUATION OF WHEEZING: CPT | Performed by: INTERNAL MEDICINE

## 2018-11-15 PROCEDURE — 94760 N-INVAS EAR/PLS OXIMETRY 1: CPT

## 2018-11-15 RX ORDER — ALBUTEROL SULFATE 2.5 MG/3ML
2.5 SOLUTION RESPIRATORY (INHALATION) ONCE
Status: COMPLETED | OUTPATIENT
Start: 2018-11-15 | End: 2018-11-15

## 2018-11-15 RX ADMIN — ALBUTEROL SULFATE 2.5 MG: 2.5 SOLUTION RESPIRATORY (INHALATION) at 09:01

## 2018-11-20 ENCOUNTER — TELEPHONE (OUTPATIENT)
Dept: INTERNAL MEDICINE CLINIC | Facility: CLINIC | Age: 48
End: 2018-11-20

## 2018-11-20 PROBLEM — M17.12 OSTEOARTHRITIS OF LEFT KNEE: Status: ACTIVE | Noted: 2018-11-20

## 2018-11-20 NOTE — TELEPHONE ENCOUNTER
Patient aware ----- Message from Pauline Mendez DO sent at 11/20/2018 12:58 PM EST -----  Call patient has moderate arthritis of his left knee

## 2018-11-21 ENCOUNTER — OFFICE VISIT (OUTPATIENT)
Dept: OBGYN CLINIC | Facility: CLINIC | Age: 48
End: 2018-11-21
Payer: COMMERCIAL

## 2018-11-21 VITALS
DIASTOLIC BLOOD PRESSURE: 93 MMHG | WEIGHT: 222.6 LBS | SYSTOLIC BLOOD PRESSURE: 156 MMHG | BODY MASS INDEX: 35.77 KG/M2 | HEIGHT: 66 IN | HEART RATE: 83 BPM

## 2018-11-21 DIAGNOSIS — M25.562 LEFT KNEE PAIN, UNSPECIFIED CHRONICITY: ICD-10-CM

## 2018-11-21 DIAGNOSIS — M25.562 CHRONIC PAIN OF LEFT KNEE: ICD-10-CM

## 2018-11-21 DIAGNOSIS — M17.12 PRIMARY OSTEOARTHRITIS OF LEFT KNEE: Primary | ICD-10-CM

## 2018-11-21 DIAGNOSIS — G89.29 CHRONIC PAIN OF LEFT KNEE: ICD-10-CM

## 2018-11-21 PROCEDURE — 99243 OFF/OP CNSLTJ NEW/EST LOW 30: CPT | Performed by: ORTHOPAEDIC SURGERY

## 2018-11-21 PROCEDURE — 20610 DRAIN/INJ JOINT/BURSA W/O US: CPT | Performed by: ORTHOPAEDIC SURGERY

## 2018-11-21 RX ORDER — BUPIVACAINE HYDROCHLORIDE 2.5 MG/ML
2 INJECTION, SOLUTION INFILTRATION; PERINEURAL
Status: COMPLETED | OUTPATIENT
Start: 2018-11-21 | End: 2018-11-21

## 2018-11-21 RX ORDER — METHYLPREDNISOLONE ACETATE 40 MG/ML
1 INJECTION, SUSPENSION INTRA-ARTICULAR; INTRALESIONAL; INTRAMUSCULAR; SOFT TISSUE
Status: COMPLETED | OUTPATIENT
Start: 2018-11-21 | End: 2018-11-21

## 2018-11-21 RX ORDER — LIDOCAINE HYDROCHLORIDE 5 MG/ML
2 INJECTION, SOLUTION INFILTRATION; PERINEURAL
Status: COMPLETED | OUTPATIENT
Start: 2018-11-21 | End: 2018-11-21

## 2018-11-21 RX ADMIN — METHYLPREDNISOLONE ACETATE 1 ML: 40 INJECTION, SUSPENSION INTRA-ARTICULAR; INTRALESIONAL; INTRAMUSCULAR; SOFT TISSUE at 14:42

## 2018-11-21 RX ADMIN — LIDOCAINE HYDROCHLORIDE 2 ML: 5 INJECTION, SOLUTION INFILTRATION; PERINEURAL at 14:42

## 2018-11-21 RX ADMIN — BUPIVACAINE HYDROCHLORIDE 2 ML: 2.5 INJECTION, SOLUTION INFILTRATION; PERINEURAL at 14:42

## 2018-11-21 NOTE — PROGRESS NOTES
HPI:  Patient is a 57y o  year old LHD male who presents with chief complaint of medial left knee  Pt states that he has been having pain in his left knee for about 2 years  Pt states that his knee intermittently gives out when he sits for long periods of time and then stands up    Pt states that he has pressure under his knee cap when he is weight bearing  Pt states that he has aching pain when he is laying down  Pt states that he has increased ain and difficulty going down stairs  Pt states that he has been taking aleve for pain but it is not helping  Pt states he has been trying to get into pain management  Pt states that he has tried leg lifts at home Pt has been seen at [facility] for his right knee where he has received CSI and viso injections  Pt states that the injections only helped for short periods of time  Pt has a knee brace that he got through the mail but he has not worn it  Pt has not done forma physical therapy  Pt has Hx of right total knee arthroplasty and right reconstruction of the ACL and back surgery         ROS:   General: No fever, no chills, no weight loss, no weight gain  HEENT:  No loss of hearing, no nose bleeds, no sore throat  0Eyes:  No eye pain, no red eyes, no visual disturbance  Respiratory:  +cough, no shortness of breath, no wheezing  Cardiovascular:  No chest pain, no palpitations, no edema  GI: No abdominal pain, no nausea, no vomiting  Endocrine: No frequent urination, no excessive thirst  Urinary:  No dysuria, no hematuria, no incontinence  Musculoskeletal: see HPI and PE  Skin:  No rash, no wounds  Neurological:  No dizziness, no headache, no numbness  Psychiatric:  No difficulty concentrating, no depression, no suicide thoughts, no anxiety  Review of all other systems is negative    PMH:  Past Medical History:   Diagnosis Date    Arthritis     COPD (chronic obstructive pulmonary disease) (HCC)     Emphysema lung (HCC)     Gout     Hypertension        PSH:  Past Surgical History:   Procedure Laterality Date    APPENDECTOMY      BACK SURGERY      JOINT REPLACEMENT Right     knee    REPLACEMENT TOTAL KNEE Right        Medications:  Current Outpatient Prescriptions   Medication Sig Dispense Refill    albuterol (2 5 mg/3 mL) 0 083 % nebulizer solution Take 1 vial (2 5 mg total) by nebulization every 6 (six) hours as needed for shortness of breath 10 vial 0    albuterol (PROVENTIL HFA,VENTOLIN HFA) 90 mcg/act inhaler Inhale 2 puffs every 4 (four) hours as needed for wheezing 1 Inhaler 0    allopurinol (ZYLOPRIM) 300 mg tablet Take 1 tablet (300 mg total) by mouth daily 90 tablet 3    aspirin 325 mg tablet Take 1 tablet (325 mg total) by mouth daily 30 tablet 0    fluticasone-vilanterol (BREO ELLIPTA) 200-25 MCG/INH inhaler Inhale 1 puff daily Rinse mouth after use  1 Inhaler 5    losartan (COZAAR) 100 MG tablet Take 1 tablet (100 mg total) by mouth daily 90 tablet 3    rosuvastatin (CRESTOR) 20 MG tablet Take 1 tablet (20 mg total) by mouth daily 90 tablet 2     No current facility-administered medications for this visit  Allergies:  No Known Allergies    Family History:  Family History   Problem Relation Age of Onset    Coronary artery disease Mother     Hypertension Mother     Diabetes Mother     Asthma Mother     COPD Mother     Thrombosis Mother     Arthritis Mother     Hyperlipidemia Sister     Thyroid disease Sister     Breast cancer Sister        Social History:  Social History     Occupational History    Not on file  Social History Main Topics    Smoking status: Current Every Day Smoker     Packs/day: 1 00     Types: Cigarettes    Smokeless tobacco: Never Used    Alcohol use Yes      Comment: occasional    Drug use: No    Sexual activity: No       Physical Exam:  General :  Alert, cooperative, no distress, appears stated age  Blood pressure 156/93, pulse 83, height 5' 6" (1 676 m), weight 101 kg (222 lb 9 6 oz)     Head: Normocephalic, without obvious abnormality, atraumatic   Eyes:  Conjunctiva/corneas clear, EOM's intact,   Ears: Both ears normal appearance, no hearing deficits  Nose: Nares normal, septum midline, no drainage    Neck: Supple,  trachea midline, no adenopathy, no tenderness, no mass   Back:   Symmetric, no curvature, ROM normal, no tenderness   Lungs:   Respirations unlabored   Chest Wall:  No tenderness or deformity   Extremities: Extremities normal, atraumatic, no cyanosis or edema      Pulses: 2+ and symmetric   Skin: Skin color, texture, turgor normal, no rashes or lesions      Neurologic: Normal           Left Knee Exam   Swelling: Moderate  Effusion: Yes    Range of Motion   Normal left knee ROM    Muscle Strength   Normal left knee strength    Comments:  Positive medial joint line tenderness  No lateral joint line tenderness  Crepitus with flexion-extension  Stable to varus and valgus testing  Negative Lachman's  Negative anterior-posterior drawer test          Imaging Studies: The following imaging studies were reviewed in office today  My findings are noted  Three views left knee taken 11/15/2018 shows tricompartmental osteoarthritis    Assessment  Encounter Diagnoses   Name Primary?     Left knee pain, unspecified chronicity     Chronic pain of left knee     Primary osteoarthritis of left knee Yes     Large joint arthrocentesis  Date/Time: 11/21/2018 2:42 PM  Consent given by: patient  Site marked: site marked  Timeout: Immediately prior to procedure a time out was called to verify the correct patient, procedure, equipment, support staff and site/side marked as required   Supporting Documentation  Indications: pain   Procedure Details  Location: knee - L knee  Preparation: Patient was prepped and draped in the usual sterile fashion  Ultrasound guidance: no  Medications administered: 2 mL bupivacaine 0 25 %; 2 mL lidocaine 0 5 %; 1 mL methylPREDNISolone acetate 40 mg/mL    Patient tolerance: patient tolerated the procedure well with no immediate complications  Dressing:  Sterile dressing applied          Plan: It was discussed with patient to continue conservative treatment including CSI and home exercise program and anti-inflammatories ice and heat  We discussed that he does have significant arthritis and if he has severe daily pain not responsive to non operative care total knee arthroplasty is an option on an elective basis  Patient would like to continue conservative treatment at this time due to his unstable living environment  CSI was administered today without complications  Pt will follow up in 3 months  In the meantime we will prior authorize Synvisc-One injections per the prior authorization protocol  In the meantime patient will work on home exercise program was provided to him in the office today and patient was advised to take anti-inflammatories for discomfort and inflammation      Scribe Attestation    I,:   Jairo Marroquin am acting as a scribe while in the presence of the attending physician :        I,:   Laly Bass MD personally performed the services described in this documentation    as scribed in my presence :

## 2019-01-08 NOTE — TELEPHONE ENCOUNTER
Unable to reach patient - received a [employer] message back stating to call back at a later time since my call could not be completed  I was calling patient to schedule appt for visco injections with Dr Mohan Medina  They have an appt on 2/22/19 I will send injections to the office so when patients goes for appt they can start that day and follow up with two more afterwards

## 2019-01-08 NOTE — TELEPHONE ENCOUNTER
Patient called back advised it is a 3 series shot  He is ok with scheduling  Please contact patient    Thanks

## 2019-02-22 ENCOUNTER — OFFICE VISIT (OUTPATIENT)
Dept: OBGYN CLINIC | Facility: CLINIC | Age: 49
End: 2019-02-22
Payer: COMMERCIAL

## 2019-02-22 VITALS
DIASTOLIC BLOOD PRESSURE: 104 MMHG | HEIGHT: 66 IN | WEIGHT: 223 LBS | HEART RATE: 90 BPM | SYSTOLIC BLOOD PRESSURE: 150 MMHG | BODY MASS INDEX: 35.84 KG/M2

## 2019-02-22 DIAGNOSIS — M17.12 PRIMARY OSTEOARTHRITIS OF LEFT KNEE: Primary | ICD-10-CM

## 2019-02-22 PROCEDURE — 20610 DRAIN/INJ JOINT/BURSA W/O US: CPT | Performed by: PHYSICIAN ASSISTANT

## 2019-02-22 RX ORDER — HYALURONATE SODIUM 10 MG/ML
20 SYRINGE (ML) INTRAARTICULAR
Status: COMPLETED | OUTPATIENT
Start: 2019-02-22 | End: 2019-02-22

## 2019-02-22 RX ADMIN — Medication 20 MG: at 09:20

## 2019-02-22 NOTE — PROGRESS NOTES
Chief Complaint   Patient presents with    Left Knee - Follow-up, Pain         Subjective   Patient here for 1/3 Euflexxa injection left knee  Patient has some increased pain left knee  Past Medical History:   Diagnosis Date    Arthritis     [facility]) [facility] [address] [facility]     Emphysema lung ([facility] [address] [facility]     Gout     Hypertension        Current Outpatient Medications on File Prior to Visit   Medication Sig Dispense Refill    albuterol (2 5 mg/3 mL) 0 083 % nebulizer solution Take 1 vial (2 5 mg total) by nebulization every 6 (six) hours as needed for shortness of breath 10 vial 0    albuterol (PROVENTIL HFA,VENTOLIN HFA) 90 mcg/act inhaler Inhale 2 puffs every 4 (four) hours as needed for wheezing 1 Inhaler 0    allopurinol (ZYLOPRIM) 300 mg tablet Take 1 tablet (300 mg total) by mouth daily 90 tablet 3    aspirin 325 mg tablet Take 1 tablet (325 mg total) by mouth daily 30 tablet 0    fluticasone-vilanterol (BREO ELLIPTA) 200-25 MCG/INH inhaler Inhale 1 puff daily Rinse mouth after use  1 Inhaler 5    losartan (COZAAR) 100 MG tablet Take 1 tablet (100 mg total) by mouth daily 90 tablet 3    rosuvastatin (CRESTOR) 20 MG tablet Take 1 tablet (20 mg total) by mouth daily 90 tablet 2     No current facility-administered medications on file prior to visit  No Known Allergies      Physical Exam:    Vitals:    02/22/19 0900 02/22/19 0905   BP: (!) 152/102 (!) 150/104   Pulse: 96 90   Weight: 101 kg (223 lb)    Height: 5' 6" (1 676 m)          Ortho Exam  Left knee skin intact with no erythema noted  Active range of motion 0-115 degrees with mild effusion  Diffuse tenderness noted over the medial and anterior aspect of the knee  Sensation is intact light touch L1-S1 distributions    ASSESSMENT:    Christina Dunn was seen today for follow-up and pain      Diagnoses and all orders for this visit:    Primary osteoarthritis of left knee          PLAN:  Patient here for 1/3 Euflexxa injection left knee  Patient with primary osteoarthritis left knee  Patient gave verbal consent to have left knee injected  This was done with no complications noted  Will use ice and injection site for 20 minutes at a time  No strenuous activity for the next 24-48 hours  We discussed with the patient today that is blood pressure was high  Patient was aware of his hypertension is currently taking medication for it  He will monitor his blood pressure at home and if it continues to be this high he will make his PCP aware  He was advised to stay away from all NSAIDs for now until his blood pressure is under control    Patient follow-up in one week for 2/3 Euflexxa iinjection left knee    Large joint arthrocentesis: L knee  Date/Time: 2/22/2019 9:20 AM  Consent given by: patient  Site marked: site marked  Timeout: Immediately prior to procedure a time out was called to verify the correct patient, procedure, equipment, support staff and site/side marked as required   Supporting Documentation  Indications: joint swelling and pain   Procedure Details  Location: knee - L knee  Preparation: Patient was prepped and draped in the usual sterile fashion  Needle gauge: 21   Ultrasound guidance: no  Approach: anterolateral  Medications administered: 20 mg Sodium Hyaluronate 20 MG/2ML  Specialty Pharmacy Supplied: received medications from pharmacy  Patient tolerance: patient tolerated the procedure well with no immediate complications  Dressing:  Sterile dressing applied

## 2019-03-01 VITALS
SYSTOLIC BLOOD PRESSURE: 152 MMHG | DIASTOLIC BLOOD PRESSURE: 87 MMHG | BODY MASS INDEX: 35.84 KG/M2 | WEIGHT: 223 LBS | HEIGHT: 66 IN | HEART RATE: 105 BPM

## 2019-03-01 DIAGNOSIS — M17.12 PRIMARY OSTEOARTHRITIS OF LEFT KNEE: Primary | ICD-10-CM

## 2019-03-01 PROCEDURE — 20610 DRAIN/INJ JOINT/BURSA W/O US: CPT | Performed by: ORTHOPAEDIC SURGERY

## 2019-03-01 RX ADMIN — Medication 20 MG: at 11:18

## 2019-03-06 RX ORDER — HYALURONATE SODIUM 10 MG/ML
20 SYRINGE (ML) INTRAARTICULAR
Status: COMPLETED | OUTPATIENT
Start: 2019-03-01 | End: 2019-03-01

## 2019-03-08 VITALS
DIASTOLIC BLOOD PRESSURE: 78 MMHG | HEIGHT: 66 IN | HEART RATE: 102 BPM | BODY MASS INDEX: 35.84 KG/M2 | SYSTOLIC BLOOD PRESSURE: 111 MMHG | WEIGHT: 223 LBS

## 2019-03-08 DIAGNOSIS — M17.12 PRIMARY OSTEOARTHRITIS OF LEFT KNEE: Primary | ICD-10-CM

## 2019-03-08 DIAGNOSIS — M25.562 LEFT KNEE PAIN, UNSPECIFIED CHRONICITY: ICD-10-CM

## 2019-03-08 PROCEDURE — 20610 DRAIN/INJ JOINT/BURSA W/O US: CPT | Performed by: ORTHOPAEDIC SURGERY

## 2019-03-08 RX ORDER — HYALURONATE SODIUM 10 MG/ML
20 SYRINGE (ML) INTRAARTICULAR
Status: COMPLETED | OUTPATIENT
Start: 2019-03-08 | End: 2019-03-08

## 2019-03-08 RX ADMIN — Medication 20 MG: at 11:03

## 2019-03-08 NOTE — PROGRESS NOTES
1  Primary osteoarthritis of left knee     2  Left knee pain, unspecified chronicity       Patient is here for his 3rd   injection of Euflexxao the left knee  Patient reports continued pain in his left knee with ambulation  Physical exam of the knee shows no ecchymosis  Moderate joint effusion noted  Large joint arthrocentesis: L knee  Date/Time: 3/8/2019 11:03 AM  Consent given by: patient  Site marked: site marked  Timeout: Immediately prior to procedure a time out was called to verify the correct patient, procedure, equipment, support staff and site/side marked as required   Supporting Documentation  Indications: pain   Procedure Details  Location: knee - L knee  Medications administered: 20 mg Sodium Hyaluronate 20 MG/2ML  Specialty Pharmacy Supplied: received medications from pharmacy  Patient tolerance: patient tolerated the procedure well with no immediate complications  Dressing:  Sterile dressing applied          Patient tolerated procedure follow up in the office in 6 weeks      Scribe Attestation    I,:   Abimbola Weiner am acting as a scribe while in the presence of the attending physician :        I,:   Nayana Amador MD personally performed the services described in this documentation    as scribed in my presence :

## 2019-05-30 DIAGNOSIS — J44.9 CHRONIC OBSTRUCTIVE PULMONARY DISEASE, UNSPECIFIED COPD TYPE (HCC): ICD-10-CM

## 2019-05-30 DIAGNOSIS — J41.0 SIMPLE CHRONIC BRONCHITIS (HCC): ICD-10-CM

## 2019-05-30 RX ORDER — FLUTICASONE PROPIONATE 50 MCG
SPRAY, SUSPENSION (ML) NASAL
Qty: 1 BOTTLE | Refills: 4 | Status: SHIPPED | OUTPATIENT
Start: 2019-05-30

## 2019-06-20 ENCOUNTER — HOSPITAL ENCOUNTER (EMERGENCY)
Facility: HOSPITAL | Age: 49
Discharge: HOME | End: 2019-06-20
Attending: EMERGENCY MEDICINE | Admitting: EMERGENCY MEDICINE
Payer: COMMERCIAL

## 2019-06-20 ENCOUNTER — APPOINTMENT (EMERGENCY)
Dept: RADIOLOGY | Facility: HOSPITAL | Age: 49
End: 2019-06-20
Payer: COMMERCIAL

## 2019-06-20 VITALS
TEMPERATURE: 98.7 F | OXYGEN SATURATION: 95 % | RESPIRATION RATE: 17 BRPM | DIASTOLIC BLOOD PRESSURE: 91 MMHG | HEIGHT: 66 IN | SYSTOLIC BLOOD PRESSURE: 142 MMHG | WEIGHT: 222.66 LBS | HEART RATE: 87 BPM | BODY MASS INDEX: 35.79 KG/M2

## 2019-06-20 DIAGNOSIS — S61.214A LACERATION OF RIGHT RING FINGER WITHOUT FOREIGN BODY WITHOUT DAMAGE TO NAIL, INITIAL ENCOUNTER: Primary | ICD-10-CM

## 2019-06-20 PROCEDURE — 99283 EMERGENCY DEPT VISIT LOW MDM: CPT

## 2019-06-20 PROCEDURE — 73140 X-RAY EXAM OF FINGER(S): CPT

## 2019-06-20 PROCEDURE — 99283 EMERGENCY DEPT VISIT LOW MDM: CPT | Performed by: EMERGENCY MEDICINE

## 2019-06-20 PROCEDURE — 12001 RPR S/N/AX/GEN/TRNK 2.5CM/<: CPT | Performed by: EMERGENCY MEDICINE

## 2019-06-20 RX ORDER — GINSENG 100 MG
1 CAPSULE ORAL ONCE
Status: COMPLETED | OUTPATIENT
Start: 2019-06-20 | End: 2019-06-20

## 2019-06-20 RX ORDER — LIDOCAINE HYDROCHLORIDE 10 MG/ML
4 INJECTION, SOLUTION EPIDURAL; INFILTRATION; INTRACAUDAL; PERINEURAL ONCE
Status: COMPLETED | OUTPATIENT
Start: 2019-06-20 | End: 2019-06-20

## 2019-06-20 RX ADMIN — BACITRACIN ZINC 1 SMALL APPLICATION: 500 OINTMENT TOPICAL at 12:01

## 2019-06-20 RX ADMIN — LIDOCAINE HYDROCHLORIDE 4 ML: 10 INJECTION, SOLUTION EPIDURAL; INFILTRATION; INTRACAUDAL; PERINEURAL at 11:35

## 2019-06-20 NOTE — DISCHARGE INSTRUCTIONS
Keep the wound clean  It is okay to leave it exposed to air while at home, as oxygen helps wounds heal   Cover it with topical antibiotic ointment (bacitracin, Neosporin, triple antibiotic ointment) and a bandage when you are doing anything where it might get dirty  Clean it with soap and water, and pat the area dry, but do not rub at it so that you do not pull the stitches out to soon  The wound will become slightly pink and swollen as it heals, however you should be seen sooner if you develop signs of infection, including significant redness, swelling, drainage of pus, or for any other concerns  Wear the splint when you are using your finger, and when sleeping to protect the wound well heals  If your primary care doctor does not remove his stitches, using go to a local urgent care  If they will not remove them for you, you can return to the emergency department, in 10-14 days

## 2019-06-20 NOTE — ED PROVIDER NOTES
History  Chief Complaint   Patient presents with    Finger Laceration     Patient stated that he cut his right 4th digit on a  this morning     HPI    Prior to Admission Medications   Prescriptions Last Dose Informant Patient Reported? Taking? albuterol (2 5 mg/3 mL) 0 083 % nebulizer solution  Self No No   Sig: Take 1 vial (2 5 mg total) by nebulization every 6 (six) hours as needed for shortness of breath   albuterol (PROVENTIL HFA,VENTOLIN HFA) 90 mcg/act inhaler  Self No No   Sig: Inhale 2 puffs every 4 (four) hours as needed for wheezing   allopurinol (ZYLOPRIM) 300 mg tablet  Self No No   Sig: Take 1 tablet (300 mg total) by mouth daily   aspirin 325 mg tablet  Self No No   Sig: Take 1 tablet (325 mg total) by mouth daily   fluticasone (FLONASE) 50 mcg/act nasal spray   No No   Sig: INHALE 1 PUFF DAILY RINSE MOUTH AFTER USE    losartan (COZAAR) 100 MG tablet  Self No No   Sig: Take 1 tablet (100 mg total) by mouth daily   rosuvastatin (CRESTOR) 20 MG tablet  Self No No   Sig: Take 1 tablet (20 mg total) by mouth daily      Facility-Administered Medications: None       Past Medical History:   Diagnosis Date    Arthritis     COPD (chronic obstructive pulmonary disease) (HCC)     Emphysema lung (HCC)     Gout     Hypertension        Past Surgical History:   Procedure Laterality Date    APPENDECTOMY      BACK SURGERY      JOINT REPLACEMENT Right     knee    REPLACEMENT TOTAL KNEE Right        Family History   Problem Relation Age of Onset    Coronary artery disease Mother     Hypertension Mother     Diabetes Mother     Asthma Mother     COPD Mother     Thrombosis Mother    Gene Landa Arthritis Mother     Hyperlipidemia Sister     Thyroid disease Sister     Breast cancer Sister      I have reviewed and agree with the history as documented      Social History     Tobacco Use    Smoking status: Current Every Day Smoker     Packs/day: 1 00     Types: Cigarettes    Smokeless tobacco: Never Used   Substance Use Topics    Alcohol use: Yes     Comment: occasional    Drug use: No        Review of Systems    Physical Exam  Physical Exam   Constitutional: He is oriented to person, place, and time  He appears well-developed and well-nourished  No distress  HENT:   Head: Normocephalic and atraumatic  Eyes: Pupils are equal, round, and reactive to light  Conjunctivae are normal    Neck: Normal range of motion  No tracheal deviation present  Cardiovascular: Normal rate, regular rhythm and intact distal pulses  Pulses:       Radial pulses are 2+ on the right side  Pulmonary/Chest: Effort normal  No respiratory distress  Musculoskeletal:        Right hand: He exhibits laceration  He exhibits normal range of motion, no tenderness and normal capillary refill  Hands:  Neurological: He is alert and oriented to person, place, and time  GCS eye subscore is 4  GCS verbal subscore is 5  GCS motor subscore is 6  Skin: Skin is warm and dry  Psychiatric: He has a normal mood and affect  His behavior is normal    Nursing note and vitals reviewed  Vital Signs  ED Triage Vitals [06/20/19 0908]   Temperature Pulse Respirations Blood Pressure SpO2   98 7 °F (37 1 °C) 87 17 142/91 95 %      Temp src Heart Rate Source Patient Position - Orthostatic VS BP Location FiO2 (%)   -- Monitor Sitting Right arm --      Pain Score       --           Vitals:    06/20/19 0908   BP: 142/91   Pulse: 87   Patient Position - Orthostatic VS: Sitting         Visual Acuity      ED Medications  Medications   bacitracin topical ointment 1 small application (has no administration in time range)   lidocaine (PF) (XYLOCAINE-MPF) 1 % injection 4 mL (4 mL Infiltration Given 6/20/19 1135)       Diagnostic Studies  Results Reviewed     None                 XR finger fourth digit-ring RIGHT   Final Result by Marco García MD (06/20 8973)      Right ring finger laceration with no osseous abnormality or radiopaque foreign body  Workstation performed: [id number]                    Procedures  Lac Repair  Date/Time: 6/20/2019 11:25 AM  Performed by: Juan Jose Wilkinson MD  Authorized by: Juan Jose Wilkinson MD   Consent: Verbal consent obtained  Consent given by: patient  Body area: upper extremity  Location details: right ring finger  Laceration length: 2 cm  Foreign bodies: no foreign bodies  Tendon involvement: none  Nerve involvement: none  Vascular damage: no  Anesthesia: digital block    Anesthesia:  Local Anesthetic: lidocaine 1% with epinephrine  Anesthetic total: 2 5 mL      Procedure Details:  Preparation: Patient was prepped and draped in the usual sterile fashion  Irrigation solution: saline  Irrigation method: syringe and jet lavage  Amount of cleaning: standard  Debridement: none  Degree of undermining: none  Skin closure: 4-0 Prolene  Number of sutures: 6  Technique: simple  Approximation: close  Approximation difficulty: simple  Dressing: antibiotic ointment and splint  Patient tolerance: Patient tolerated the procedure well with no immediate complications             ED Course                               MDM  Number of Diagnoses or Management Options  Laceration of right ring finger without foreign body without damage to nail, initial encounter: new and requires workup  Diagnosis management comments: This is a 61-year-old male who presents here today with a right fourth finger laceration  He is a left-hand dominant male  This occurred just prior to arrival   He was using hedge clippers, slipped on the wet grass, "hit the gas" and the head covers cut his finger  He states his last tetanus shot was about two years ago  He denies any blood thinning medications  He denies any other injuries  Denies any issues with wound healing  Based on the depth of the wound, his significant other made him come in for evaluation  He endorses mild pain in the area, but has no other complaints    He washed the wound with cold water, and has been holding pressure, but has not otherwise done anything for it  ROS: Otherwise negative, unless stated as above  He is well-appearing, in no acute distress  He has a laceration to the tip of his finger which is currently hemostatic  He is neurovascularly intact distally  Given the depth of the wound x-ray was obtained and reviewed by myself, and shows no underlying bony injuries  The laceration was repaired as above without complications  I discussed with the patient treatment of this at home, follow-up for suture removal, and indications for return, and he expresses understanding with this plan  Amount and/or Complexity of Data Reviewed  Tests in the radiology section of CPT®: reviewed and ordered  Independent visualization of images, tracings, or specimens: yes        Disposition  Final diagnoses:   Laceration of right ring finger without foreign body without damage to nail, initial encounter     Time reflects when diagnosis was documented in both MDM as applicable and the Disposition within this note     Time User Action Codes Description Comment    6/20/2019 11:38 AM Giovanni Edwards Add [[id number]] Laceration of right ring finger without foreign body without damage to nail, initial encounter       ED Disposition     ED Disposition Condition Date/Time Comment    Discharge Good Thu Jun 20, 2019 11:36 AM Agus Mckinney discharge to home/self care              Follow-up Information     Follow up With Specialties Details Why Contact Info Additional Jose Miguel 163, DO Internal Medicine Schedule an appointment as soon as possible for a visit in 2 weeks For suture removal [phone]  [place] [address] Emergency Department Emergency Medicine Go in 2 weeks As needed, For suture removal [address] [facility] [id number]  [address] [place] [address] ED, [address] [place], [place], [address]          Patient's Medications   Discharge Prescriptions    No medications on file     No discharge procedures on file      ED Provider  Electronically Signed by           Gino Stone MD  06/30/19 5313

## 2021-02-23 ENCOUNTER — TELEPHONE (OUTPATIENT)
Dept: INTERNAL MEDICINE CLINIC | Facility: CLINIC | Age: 51
End: 2021-02-23

## 2021-02-23 NOTE — TELEPHONE ENCOUNTER
Patient has not been seen since October of 2018  Please find out if he is following with this practice

## 2022-12-16 ENCOUNTER — VBI (OUTPATIENT)
Dept: ADMINISTRATIVE | Facility: OTHER | Age: 52
End: 2022-12-16

## 2023-05-29 NOTE — ASSESSMENT & PLAN NOTE
Patient counseled about using other peoples medications especially opiates  Ok to keep robaxin  show (2) assistive person

## 2023-07-25 ENCOUNTER — VBI (OUTPATIENT)
Dept: ADMINISTRATIVE | Facility: OTHER | Age: 53
End: 2023-07-25